# Patient Record
Sex: MALE | Race: BLACK OR AFRICAN AMERICAN | NOT HISPANIC OR LATINO | Employment: OTHER | URBAN - METROPOLITAN AREA
[De-identification: names, ages, dates, MRNs, and addresses within clinical notes are randomized per-mention and may not be internally consistent; named-entity substitution may affect disease eponyms.]

---

## 2021-03-05 ENCOUNTER — OFFICE VISIT (OUTPATIENT)
Dept: UROLOGY | Facility: CLINIC | Age: 66
End: 2021-03-05
Payer: MEDICARE

## 2021-03-05 ENCOUNTER — LAB VISIT (OUTPATIENT)
Dept: LAB | Facility: HOSPITAL | Age: 66
End: 2021-03-05
Payer: MEDICARE

## 2021-03-05 VITALS
HEIGHT: 67 IN | WEIGHT: 185.44 LBS | DIASTOLIC BLOOD PRESSURE: 86 MMHG | HEART RATE: 82 BPM | SYSTOLIC BLOOD PRESSURE: 138 MMHG | BODY MASS INDEX: 29.1 KG/M2

## 2021-03-05 DIAGNOSIS — N52.01 ERECTILE DYSFUNCTION DUE TO ARTERIAL INSUFFICIENCY: ICD-10-CM

## 2021-03-05 DIAGNOSIS — N13.8 BPH WITH URINARY OBSTRUCTION: ICD-10-CM

## 2021-03-05 DIAGNOSIS — R35.1 NOCTURIA: ICD-10-CM

## 2021-03-05 DIAGNOSIS — R35.0 URINARY FREQUENCY: Primary | ICD-10-CM

## 2021-03-05 DIAGNOSIS — N40.1 BPH WITH URINARY OBSTRUCTION: ICD-10-CM

## 2021-03-05 DIAGNOSIS — R35.0 URINARY FREQUENCY: ICD-10-CM

## 2021-03-05 LAB
BILIRUB SERPL-MCNC: NORMAL MG/DL
BLOOD URINE, POC: NORMAL
CLARITY, POC UA: CLEAR
COLOR, POC UA: NORMAL
COMPLEXED PSA SERPL-MCNC: 0.29 NG/ML (ref 0–4)
CREAT SERPL-MCNC: 1 MG/DL (ref 0.5–1.4)
EST. GFR  (AFRICAN AMERICAN): >60 ML/MIN/1.73 M^2
EST. GFR  (NON AFRICAN AMERICAN): >60 ML/MIN/1.73 M^2
GLUCOSE UR QL STRIP: NORMAL
KETONES UR QL STRIP: NORMAL
LEUKOCYTE ESTERASE URINE, POC: NORMAL
NITRITE, POC UA: NORMAL
PH, POC UA: 5
POC RESIDUAL URINE VOLUME: 42 ML (ref 0–100)
PROTEIN, POC: NORMAL
SPECIFIC GRAVITY, POC UA: 1.02
TESTOST SERPL-MCNC: 476 NG/DL (ref 304–1227)
UROBILINOGEN, POC UA: NORMAL

## 2021-03-05 PROCEDURE — 51798 POCT BLADDER SCAN: ICD-10-PCS | Mod: S$GLB,,, | Performed by: NURSE PRACTITIONER

## 2021-03-05 PROCEDURE — 36415 COLL VENOUS BLD VENIPUNCTURE: CPT | Performed by: NURSE PRACTITIONER

## 2021-03-05 PROCEDURE — 1126F PR PAIN SEVERITY QUANTIFIED, NO PAIN PRESENT: ICD-10-PCS | Mod: S$GLB,,, | Performed by: NURSE PRACTITIONER

## 2021-03-05 PROCEDURE — 99204 OFFICE O/P NEW MOD 45 MIN: CPT | Mod: 25,S$GLB,, | Performed by: NURSE PRACTITIONER

## 2021-03-05 PROCEDURE — 99999 PR PBB SHADOW E&M-NEW PATIENT-LVL III: CPT | Mod: PBBFAC,,, | Performed by: NURSE PRACTITIONER

## 2021-03-05 PROCEDURE — 99999 PR PBB SHADOW E&M-NEW PATIENT-LVL III: ICD-10-PCS | Mod: PBBFAC,,, | Performed by: NURSE PRACTITIONER

## 2021-03-05 PROCEDURE — 84403 ASSAY OF TOTAL TESTOSTERONE: CPT | Performed by: NURSE PRACTITIONER

## 2021-03-05 PROCEDURE — 1126F AMNT PAIN NOTED NONE PRSNT: CPT | Mod: S$GLB,,, | Performed by: NURSE PRACTITIONER

## 2021-03-05 PROCEDURE — 1101F PR PT FALLS ASSESS DOC 0-1 FALLS W/OUT INJ PAST YR: ICD-10-PCS | Mod: CPTII,S$GLB,, | Performed by: NURSE PRACTITIONER

## 2021-03-05 PROCEDURE — 99204 PR OFFICE/OUTPT VISIT, NEW, LEVL IV, 45-59 MIN: ICD-10-PCS | Mod: 25,S$GLB,, | Performed by: NURSE PRACTITIONER

## 2021-03-05 PROCEDURE — 51798 US URINE CAPACITY MEASURE: CPT | Mod: S$GLB,,, | Performed by: NURSE PRACTITIONER

## 2021-03-05 PROCEDURE — 81002 URINALYSIS NONAUTO W/O SCOPE: CPT | Mod: S$GLB,,, | Performed by: NURSE PRACTITIONER

## 2021-03-05 PROCEDURE — 3008F PR BODY MASS INDEX (BMI) DOCUMENTED: ICD-10-PCS | Mod: CPTII,S$GLB,, | Performed by: NURSE PRACTITIONER

## 2021-03-05 PROCEDURE — 1101F PT FALLS ASSESS-DOCD LE1/YR: CPT | Mod: CPTII,S$GLB,, | Performed by: NURSE PRACTITIONER

## 2021-03-05 PROCEDURE — 3288F FALL RISK ASSESSMENT DOCD: CPT | Mod: CPTII,S$GLB,, | Performed by: NURSE PRACTITIONER

## 2021-03-05 PROCEDURE — 3008F BODY MASS INDEX DOCD: CPT | Mod: CPTII,S$GLB,, | Performed by: NURSE PRACTITIONER

## 2021-03-05 PROCEDURE — 81002 POCT URINE DIPSTICK WITHOUT MICROSCOPE: ICD-10-PCS | Mod: S$GLB,,, | Performed by: NURSE PRACTITIONER

## 2021-03-05 PROCEDURE — 84153 ASSAY OF PSA TOTAL: CPT | Performed by: NURSE PRACTITIONER

## 2021-03-05 PROCEDURE — 82565 ASSAY OF CREATININE: CPT | Performed by: NURSE PRACTITIONER

## 2021-03-05 PROCEDURE — 3288F PR FALLS RISK ASSESSMENT DOCUMENTED: ICD-10-PCS | Mod: CPTII,S$GLB,, | Performed by: NURSE PRACTITIONER

## 2021-03-05 RX ORDER — TAMSULOSIN HYDROCHLORIDE 0.4 MG/1
0.4 CAPSULE ORAL NIGHTLY
Qty: 90 CAPSULE | Refills: 3 | Status: SHIPPED | OUTPATIENT
Start: 2021-03-05 | End: 2022-01-26

## 2021-03-05 RX ORDER — SILDENAFIL 100 MG/1
100 TABLET, FILM COATED ORAL DAILY PRN
Qty: 10 TABLET | Refills: 12 | Status: SHIPPED | OUTPATIENT
Start: 2021-03-05 | End: 2022-03-05

## 2022-10-24 ENCOUNTER — TELEPHONE (OUTPATIENT)
Dept: NEUROLOGY | Facility: CLINIC | Age: 67
End: 2022-10-24
Payer: MEDICARE

## 2022-10-24 DIAGNOSIS — R20.0 NUMBNESS OF ARM: Primary | ICD-10-CM

## 2022-10-24 NOTE — TELEPHONE ENCOUNTER
Spoke to patient's wife Nivia and scheduled the EMG Procedure that was ordered.  She asked that it please be scheduled for a morning appointment, preferably 9am. The appointment is scheduled for Monday December 19th at 9 am. The address on file was confirmed with Ms. Gonzáles and an appointment letter is being placed in the mail.   No

## 2022-10-24 NOTE — TELEPHONE ENCOUNTER
----- Message from Marcia Rodriguez MA sent at 10/20/2022  9:01 AM CDT -----  Good morning,    We received an order from  for this patient to have an EMG. The referring diagnosis is R20.0. I have scanned the order into . Please contact the patient to schedule.    Thank you   [facility]   Ext [phone]

## 2022-12-19 ENCOUNTER — PROCEDURE VISIT (OUTPATIENT)
Dept: NEUROLOGY | Facility: CLINIC | Age: 67
End: 2022-12-19
Payer: MEDICARE

## 2022-12-19 DIAGNOSIS — R20.0 NUMBNESS OF ARM: ICD-10-CM

## 2022-12-19 PROCEDURE — 95886 MUSC TEST DONE W/N TEST COMP: CPT | Mod: S$GLB,,, | Performed by: PSYCHIATRY & NEUROLOGY

## 2022-12-19 PROCEDURE — 95913 NRV CNDJ TEST 13/> STUDIES: CPT | Mod: S$GLB,,, | Performed by: PSYCHIATRY & NEUROLOGY

## 2022-12-19 PROCEDURE — 95886 PR EMG COMPLETE, W/ NERVE CONDUCTION STUDIES, 5+ MUSCLES: ICD-10-PCS | Mod: S$GLB,,, | Performed by: PSYCHIATRY & NEUROLOGY

## 2022-12-19 PROCEDURE — 95913 PR NERVE CONDUCTION STUDY; 13 OR MORE STUDIES: ICD-10-PCS | Mod: S$GLB,,, | Performed by: PSYCHIATRY & NEUROLOGY

## 2024-11-01 ENCOUNTER — OFFICE VISIT (OUTPATIENT)
Dept: UROLOGY | Facility: CLINIC | Age: 69
End: 2024-11-01
Payer: MEDICARE

## 2024-11-01 ENCOUNTER — LAB VISIT (OUTPATIENT)
Dept: LAB | Facility: HOSPITAL | Age: 69
End: 2024-11-01
Attending: STUDENT IN AN ORGANIZED HEALTH CARE EDUCATION/TRAINING PROGRAM
Payer: MEDICARE

## 2024-11-01 VITALS
DIASTOLIC BLOOD PRESSURE: 71 MMHG | SYSTOLIC BLOOD PRESSURE: 127 MMHG | HEIGHT: 67 IN | BODY MASS INDEX: 25.47 KG/M2 | HEART RATE: 56 BPM | WEIGHT: 162.25 LBS

## 2024-11-01 DIAGNOSIS — C61 PROSTATE CANCER: ICD-10-CM

## 2024-11-01 DIAGNOSIS — C61 PROSTATE CANCER: Primary | ICD-10-CM

## 2024-11-01 PROCEDURE — 36415 COLL VENOUS BLD VENIPUNCTURE: CPT | Performed by: STUDENT IN AN ORGANIZED HEALTH CARE EDUCATION/TRAINING PROGRAM

## 2024-11-01 PROCEDURE — 84153 ASSAY OF PSA TOTAL: CPT | Performed by: STUDENT IN AN ORGANIZED HEALTH CARE EDUCATION/TRAINING PROGRAM

## 2024-11-01 PROCEDURE — 99999 PR PBB SHADOW E&M-EST. PATIENT-LVL III: CPT | Mod: PBBFAC,,, | Performed by: STUDENT IN AN ORGANIZED HEALTH CARE EDUCATION/TRAINING PROGRAM

## 2024-11-04 LAB — PSA SERPL-MCNC: 10.2 NG/ML

## 2024-11-08 ENCOUNTER — PATIENT MESSAGE (OUTPATIENT)
Dept: UROLOGY | Facility: CLINIC | Age: 69
End: 2024-11-08
Payer: MEDICARE

## 2024-11-14 DIAGNOSIS — C61 PROSTATE CANCER: Primary | ICD-10-CM

## 2024-11-18 ENCOUNTER — TELEPHONE (OUTPATIENT)
Dept: HEMATOLOGY/ONCOLOGY | Facility: CLINIC | Age: 69
End: 2024-11-18
Payer: MEDICARE

## 2024-11-18 NOTE — NURSING
Nurse navigator spoke with daughter to coordinate med/onc appt with Dr. Maravilla on 12/11/24.  Daughter states understanding and all questions answered.  Contact info given for any future questions.

## 2024-11-19 ENCOUNTER — PATIENT MESSAGE (OUTPATIENT)
Dept: UROLOGY | Facility: CLINIC | Age: 69
End: 2024-11-19
Payer: MEDICARE

## 2024-12-11 ENCOUNTER — HOSPITAL ENCOUNTER (OUTPATIENT)
Dept: RADIOLOGY | Facility: HOSPITAL | Age: 69
Discharge: HOME | End: 2024-12-11
Attending: STUDENT IN AN ORGANIZED HEALTH CARE EDUCATION/TRAINING PROGRAM
Payer: MEDICARE

## 2024-12-11 ENCOUNTER — OFFICE VISIT (OUTPATIENT)
Dept: HEMATOLOGY/ONCOLOGY | Facility: CLINIC | Age: 69
End: 2024-12-11
Payer: MEDICARE

## 2024-12-11 VITALS
OXYGEN SATURATION: 95 % | WEIGHT: 173.06 LBS | TEMPERATURE: 98 F | DIASTOLIC BLOOD PRESSURE: 68 MMHG | SYSTOLIC BLOOD PRESSURE: 136 MMHG | HEART RATE: 63 BPM | BODY MASS INDEX: 27.11 KG/M2

## 2024-12-11 DIAGNOSIS — D75.89 MACROCYTOSIS: ICD-10-CM

## 2024-12-11 DIAGNOSIS — D63.0 ANEMIA IN NEOPLASTIC DISEASE: ICD-10-CM

## 2024-12-11 DIAGNOSIS — C61 PROSTATE CANCER: ICD-10-CM

## 2024-12-11 DIAGNOSIS — Z79.818 ANDROGEN DEPRIVATION THERAPY: ICD-10-CM

## 2024-12-11 DIAGNOSIS — Z79.899 LONG TERM CURRENT USE OF THERAPEUTIC DRUG: ICD-10-CM

## 2024-12-11 DIAGNOSIS — C61 PROSTATE CANCER: Primary | ICD-10-CM

## 2024-12-11 PROCEDURE — A9585 GADOBUTROL INJECTION: HCPCS | Performed by: STUDENT IN AN ORGANIZED HEALTH CARE EDUCATION/TRAINING PROGRAM

## 2024-12-11 PROCEDURE — 72197 MRI PELVIS W/O & W/DYE: CPT | Mod: TC

## 2024-12-11 PROCEDURE — 99999 PR PBB SHADOW E&M-EST. PATIENT-LVL V: CPT | Mod: PBBFAC,,, | Performed by: HOSPITALIST

## 2024-12-11 PROCEDURE — 99205 OFFICE O/P NEW HI 60 MIN: CPT | Mod: S$GLB,,, | Performed by: HOSPITALIST

## 2024-12-11 PROCEDURE — 3075F SYST BP GE 130 - 139MM HG: CPT | Mod: CPTII,S$GLB,, | Performed by: HOSPITALIST

## 2024-12-11 PROCEDURE — 1159F MED LIST DOCD IN RCRD: CPT | Mod: CPTII,S$GLB,, | Performed by: HOSPITALIST

## 2024-12-11 PROCEDURE — 1126F AMNT PAIN NOTED NONE PRSNT: CPT | Mod: CPTII,S$GLB,, | Performed by: HOSPITALIST

## 2024-12-11 PROCEDURE — 72197 MRI PELVIS W/O & W/DYE: CPT | Mod: 26,,, | Performed by: RADIOLOGY

## 2024-12-11 PROCEDURE — 1101F PT FALLS ASSESS-DOCD LE1/YR: CPT | Mod: CPTII,S$GLB,, | Performed by: HOSPITALIST

## 2024-12-11 PROCEDURE — 3008F BODY MASS INDEX DOCD: CPT | Mod: CPTII,S$GLB,, | Performed by: HOSPITALIST

## 2024-12-11 PROCEDURE — 3288F FALL RISK ASSESSMENT DOCD: CPT | Mod: CPTII,S$GLB,, | Performed by: HOSPITALIST

## 2024-12-11 PROCEDURE — 25500020 PHARM REV CODE 255: Performed by: STUDENT IN AN ORGANIZED HEALTH CARE EDUCATION/TRAINING PROGRAM

## 2024-12-11 PROCEDURE — 3078F DIAST BP <80 MM HG: CPT | Mod: CPTII,S$GLB,, | Performed by: HOSPITALIST

## 2024-12-11 RX ORDER — VIT C/E/ZN/COPPR/LUTEIN/ZEAXAN 250MG-90MG
1000 CAPSULE ORAL DAILY
Qty: 30 CAPSULE | Refills: 11 | Status: SHIPPED | OUTPATIENT
Start: 2024-12-11

## 2024-12-11 RX ORDER — BICALUTAMIDE 50 MG/1
50 TABLET, FILM COATED ORAL DAILY
Qty: 30 TABLET | Refills: 0 | Status: SHIPPED | OUTPATIENT
Start: 2024-12-11 | End: 2025-12-11

## 2024-12-11 RX ORDER — GADOBUTROL 604.72 MG/ML
8 INJECTION INTRAVENOUS
Status: COMPLETED | OUTPATIENT
Start: 2024-12-11 | End: 2024-12-11

## 2024-12-11 RX ADMIN — GADOBUTROL 8 ML: 604.72 INJECTION INTRAVENOUS at 07:12

## 2024-12-11 NOTE — PATIENT INSTRUCTIONS
We reviewed your recent PSA levels following radiation treatment for prostate cancer in 2012. Quickly rising PSA is consistent with recurrent prostate cancer. MRI from today is pending and will schedule PSMA PET CT ASAP to determine the extent to which the cancer has returned.    Regardless, we will need to start hormonal therapy for prostate cancer. RX for bicalutamide called into your local pharmacy. Can start taking right after PSMA PET CT scan.Will also plan to start androgen deprivation therapy with Lupron shots in 2-3 weeks.    We discussed typical side effects of hormone therapy including fatigue, weight gain, loss libido, forgetfulness, hot flashes, and muscle loss. Other longer term risks can include increased risk of cardiovascular disease and osteoporosis.    - We will schedule you an appointment with our genetics counseling clinic to discuss screening for the presence of a high risk cancer gene     - Please start taking vitamin D 1000 IU daily; I called a prescription into your local pharmacy  - Please get 4 servings of calcium daily  - We will schedule a bone mineral density test    Plan to follow up with first Lupron shot and to review PSMA PET CT in ~3 weeks.    If cancer has spread, will need to discuss adding additional hormone pills.    If the cancer has not spread, will look into referring you to radiation oncology.    Good information on prostate cancer can be found at [email or web address]

## 2024-12-11 NOTE — PROGRESS NOTES
[facility]: New patient visit  Best Contact Phone Number(s): [phone] (home)       Cancer/Stage/TNM:    Cancer Staging   No matching staging information was found for the patient.        Reason for visit:  Elevated PSA with history of prostate cancer    Molecular:  Germline Testing: N/A  Somatic Testing: N/A    Treatment History:   N/A     HPI:   Mode Gabriel is a 69 y.o. male with metachronous recurrence of prostate cancer. Initially diagnosed 2012 at [facility], underwent short course course ADT with definitive EBRT. Ultimately lost to follow up. Represented to PCP 10/2024 with pSA elevated to 5.98. More recently 10.2 on 11/1/24. He presents to medical oncology clinic for initial evaluation.    No difficulty urinating. No hesitancy. Flow is normal. Does have some moderate urgency and frequency but no incontinence. Nocturia about 3x per night.  Not on any BPH medication. Energy levels are good. Lives with his wife; accompanied by daughter Rae. Ambulates independently indefinitely. Rides his bike frequently as well. No pain. Appetite is OK. No N/V. No CP, SOB or cough. No abdominal pain. Reports and about 10 pound weight  loss over the last year, but weight is back up. No night sweats or fevers or chills.     Denies signficant other medical issues. No MI or stroke. No known DM2. No COPD or asthma. Does not take any medication    Lab Results   Component Value Date    PSADIAG 0.29 03/05/2021         History has been obtained by chart review and discussion with the patient.     Oncology History   Prostate cancer   2012 Initial Diagnosis    2012: Initial diagnosis and treatment at [facility]. Reportedly Elvis 7. Received hormone therapy and short course ADT.      11/2019 Tumor Markers    11/12/2019 -- PSA 0.3  12/24/2020 -- PSA 0.3  3/5/2021 -- PSA 0.29     10/2024 Tumor Markers    10/07/2024: PSA 5.98  11/01/2024: PSA 10.2           Past Medical History:   Diagnosis Date    Prostate cancer           Past Surgical History:   Procedure Laterality Date    BACK SURGERY N/A 2000    [facility]         Review of patient's allergies indicates:  No Known Allergies      Current Outpatient Medications   Medication Sig Dispense Refill    bicalutamide (CASODEX) 50 MG Tab Take 1 tablet (50 mg total) by mouth once daily. 30 tablet 0    cholecalciferol, vitamin D3, (VITAMIN D3) 25 mcg (1,000 unit) capsule Take 1 capsule (1,000 Units total) by mouth once daily. 30 capsule 11    sildenafiL (VIAGRA) 100 MG tablet Take 1 tablet (100 mg total) by mouth daily as needed for Erectile Dysfunction. 10 tablet 12    tamsulosin (FLOMAX) 0.4 mg Cap TAKE 1 CAPSULE(0.4 MG) BY MOUTH EVERY EVENING 90 capsule 0     No current facility-administered medications for this visit.        Objective:      Physical Exam:   /68 (BP Location: Left arm, Patient Position: Sitting)   Pulse 63   Temp 98 °F (36.7 °C) (Temporal)   Wt 78.5 kg (173 lb 1 oz)   SpO2 95%   BMI 27.11 kg/m²       ECOG Performance status: (0) Fully active, able to carry on all predisease performance without restriction     Physical Exam  Constitutional:       General: He is not in acute distress.     Appearance: Normal appearance.   HENT:      Head: Normocephalic.   Eyes:      General: No scleral icterus.     Extraocular Movements: Extraocular movements intact.      Conjunctiva/sclera: Conjunctivae normal.   Cardiovascular:      Rate and Rhythm: Normal rate.   Pulmonary:      Effort: Pulmonary effort is normal. No respiratory distress.   Abdominal:      General: There is no distension.      Palpations: Abdomen is soft.   Skin:     General: Skin is warm and dry.   Neurological:      Mental Status: He is alert and oriented to person, place, and time.      Motor: No weakness.   Psychiatric:         Mood and Affect: Mood normal.         Behavior: Behavior normal.         Thought Content: Thought content normal.          Recent Labs:   Lab Results   Component Value Date    CREATININE  "1.0 03/05/2021          Lab Results   Component Value Date    PSADIAG 0.29 03/05/2021        Cardiovascular Screening:  Primary care physician: Evens Martins MD      The ASCVD Risk score (Mariposa DK, et al., 2019) failed to calculate for the following reasons:    Cannot find a previous HDL lab    Cannot find a previous total cholesterol lab    EKG: No results found for this or any previous visit.    Body mass index is 27.11 kg/m².    No results found for: "LABA1C", "CHOL", "LDLCALC", "HDL", "TRIG", "HGBA1C"       Bone Health    No results found for: "[id number]", "SYGQRHKM82RP"     No results found for this or any previous visit.         PSMA PET IMAGING+     No results found for this or any previous visit.       I have personally reviewed the above imaging.     Path:   Reviewed pathology as documented above.      Diagnoses:     1. Prostate cancer    2. Anemia in neoplastic disease    3. Androgen deprivation therapy    4. Long term current use of therapeutic drug    5. Macrocytosis          Assessment and Plan:     1. Prostate cancer  Overview:  Metachronous recurrence of prostate cancer. Initially diagnosed 2012 at [facility], underwent short course course ADT with definitive EBRT. Ultimately lost to follow up. Represented to PCP 10/2024 with pSA elevated to 5.98. More recently 10.2 on 11/1/24.    Assessment & Plan:  At minimum he has high-risk BCR. PSMA PET is pending to determine if he has gross radiologic disease or metastatic disease. Will require hormonal therapy of some variety. Will expedite PSMA PET CT and plan to start bicalutamide after imaging. Plan for first Lupron in 2-3 weeks. If metastatic disease would intensify treatment; if localized BCR then would plan finite ADT with referral to rad onc.  - Expedite PSMA PET CT  - FU MRI read  - Obtain [facility] Records  - Start bicalutamide 50mg po daily x30 days after PSMA PET CT  - Genetics referral  - DEXA and Vitmian D  - FU in 3 weeks for first Lupron " shot and to review PSMA PET CT    Orders:  -     Ambulatory referral/consult to Hematology / Oncology  -     NM PET CT GA68 PSMA, midthigh to vertex; Future; Expected date: 12/11/2024  -     CBC Oncology; Standing  -     Comprehensive Metabolic Panel; Standing  -     Prostate Specific Antigen, Diagnostic; Standing  -     Testosterone; Standing  -     bicalutamide (CASODEX) 50 MG Tab; Take 1 tablet (50 mg total) by mouth once daily.  Dispense: 30 tablet; Refill: 0  -     Ambulatory referral/consult to Genetics; Future; Expected date: 12/18/2024    2. Anemia in neoplastic disease  -     Ferritin; Future; Expected date: 12/11/2024  -     Iron and TIBC; Future; Expected date: 12/11/2024  -     VITAMIN B12; Future; Expected date: 12/11/2024    3. Androgen deprivation therapy  -     cholecalciferol, vitamin D3, (VITAMIN D3) 25 mcg (1,000 unit) capsule; Take 1 capsule (1,000 Units total) by mouth once daily.  Dispense: 30 capsule; Refill: 11  -     DXA Bone Density Axial Skeleton 1 or more sites; Future; Expected date: 12/11/2024  -     Vitamin D; Future; Expected date: 12/11/2024    4. Long term current use of therapeutic drug  -     cholecalciferol, vitamin D3, (VITAMIN D3) 25 mcg (1,000 unit) capsule; Take 1 capsule (1,000 Units total) by mouth once daily.  Dispense: 30 capsule; Refill: 11  -     DXA Bone Density Axial Skeleton 1 or more sites; Future; Expected date: 12/11/2024  -     Vitamin D; Future; Expected date: 12/11/2024  -     VITAMIN B12; Future; Expected date: 12/11/2024    5. Macrocytosis          Follow up:   Route Chart for Scheduling    Med Onc Chart Routing      Follow up with physician 3 weeks.   Follow up with MICHAEL    Infusion scheduling note    Injection scheduling note Lupron 3 weeks   Labs CBC, CMP and PSA   Scheduling:  Preferred lab:  Lab interval:     Imaging DXA scan      Pharmacy appointment    Other referrals refer to Oncology Primary Care - Wants to establish primary care at [facility]                     Supportive Plan Information  OP PROSTATE LEUPROLIDE Q3MO Eliseo Maravilla MD   Associated Diagnosis: Prostate cancer   noted on 12/11/2024   Line of treatment: First Line   Treatment goal: Palliative     Upcoming Treatment Dates - OP PROSTATE LEUPROLIDE Q3MO    1/2/2025       Chemotherapy       leuprolide (LUPRON) injection 22.5 mg  3/27/2025       Chemotherapy       leuprolide (LUPRON) injection 22.5 mg  6/19/2025       Chemotherapy       leuprolide (LUPRON) injection 22.5 mg  9/11/2025       Chemotherapy       leuprolide (LUPRON) injection 22.5 mg         The above information has been reviewed with the patient and all questions have been answered to their apparent satisfaction.  They understand that they can call the clinic with any questions.    Eliseo Maravilla MD MPH  Staff Physician     [facility] [address] [address]  Email: [email or web address]  Phone: o) [phone] (c) [phone]

## 2024-12-11 NOTE — ASSESSMENT & PLAN NOTE
At minimum he has high-risk BCR. PSMA PET is pending to determine if he has gross radiologic disease or metastatic disease. Will require hormonal therapy of some variety. Will expedite PSMA PET CT and plan to start bicalutamide after imaging. Plan for first Lupron in 2-3 weeks. If metastatic disease would intensify treatment; if localized BCR then would plan finite ADT with referral to rad onc.  - Expedite PSMA PET CT  - FU MRI read  - Obtain [facility] Records  - Start bicalutamide 50mg po daily x30 days after PSMA PET CT  - Genetics referral  - DEXA and Vitmian D  - FU in 3 weeks for first Lupron shot and to review PSMA PET CT

## 2024-12-12 ENCOUNTER — TELEPHONE (OUTPATIENT)
Dept: HEMATOLOGY/ONCOLOGY | Facility: CLINIC | Age: 69
End: 2024-12-12
Payer: MEDICARE

## 2024-12-19 ENCOUNTER — OFFICE VISIT (OUTPATIENT)
Dept: HEMATOLOGY/ONCOLOGY | Facility: CLINIC | Age: 69
End: 2024-12-19
Payer: MEDICARE

## 2024-12-19 ENCOUNTER — PATIENT MESSAGE (OUTPATIENT)
Dept: HEMATOLOGY/ONCOLOGY | Facility: CLINIC | Age: 69
End: 2024-12-19

## 2024-12-19 ENCOUNTER — TELEPHONE (OUTPATIENT)
Dept: HEMATOLOGY/ONCOLOGY | Facility: CLINIC | Age: 69
End: 2024-12-19

## 2024-12-19 DIAGNOSIS — C61 PROSTATE CANCER: ICD-10-CM

## 2024-12-19 DIAGNOSIS — Z71.83 ENCOUNTER FOR NONPROCREATIVE GENETIC COUNSELING: Primary | ICD-10-CM

## 2024-12-19 DIAGNOSIS — Z80.42 FAMILY HISTORY OF PROSTATE CANCER: ICD-10-CM

## 2024-12-19 DIAGNOSIS — C61 PROSTATE CANCER: Primary | ICD-10-CM

## 2024-12-19 NOTE — PROGRESS NOTES
"Cancer Genetics  Department of Hematology and Oncology  [facility]    Date of Service:  24  Visit Provider:  Preston Woods DNP  Collaborating Physician:  Shirley Guy MD    Patient ID  Name: Mode Gabriel    : 1955    MRN: [id number]      Referring Provider  Eliseo Maravilla MD  [address] [place] [address]    Televisit Information  The patient location is:  [place], [place].    The chief complaint leading to consultation is:  As below.    Visit type:  audiovisual followed by audio-only.  The reason for the audio-only service rather than synchronous-audio-and-video virtual visit was related to technical difficulties or patient preference/necessity.    Face-to-face time with patient:  Approximately 1 minutes.  Audio-only time with patient:  Approximately 26 minutes.  Total time:  Approximately 36 minutes.  This includes face to face time and non-face to face time preparing to see the patient (eg, review of tests), obtaining and/or reviewing separately obtained history, documenting clinical information in the electronic or other health record, independently interpreting results and communicating results to the patient/family/caregiver, or care coordinator.     SUBJECTIVE      Chief Complaint: Genetic Evaluation    History of Present Illness (HPI):  Mode Gabriel ("Mdoe"), 69 y.o., assigned male sex at birth, is established with the [facility] but new to me.  He was referred by Dr. Eliseo Maravilla for cancer-genetic risk assessment given his diagnosis of prostate cancer.    Focused Medical History  Genetic testing:  No  Cancer:  Prostate cancer, dx.age 56; underwent short-course ADT with definitive EBRT; re-presented 10/2024 with elevated PSA; PET-CT scheduled for 2024  Colon polyp:  "I think I did" have colon polyps ("small") - Undergoes colonoscopies at [facility], " "most recently last year(?)  Other tumor or pertinent mass/lesion:  No  Pancreatitis:  No  Blood disorder:  No    Focused Social History  Former smoker    ONCOLOGY PEDIGREE  Ancestry:  [employer]:  No  Consanguinity:  No  Hereditary cancer genetic testing in blood relatives:  No  Other than noted, no known family history of cancer.  Other than noted, no known family history of colon polyps.    ** If this pedigree appears small/illegible on your screen, expand this note window horizontally. **      Review of Systems  See HPI.       OBJECTIVE     Patient Active Problem List    Diagnosis Date Noted    Prostate cancer 12/11/2024     Past Medical History:   Diagnosis Date    Prostate cancer      Physical Exam  Significantly limited secondary to the inherent nature of a virtual visit.  Very pleasant patient.  Accompanied by his daughter, Rae, who is also very pleasant.  Constitutional      Appearance:  Appears well developed and well nourished. No distress.   Neurological     Mental Status:  Alert and oriented.  Psychiatric         Mood and Affect:  Normal.     Thought Content:  Normal.     Speech:  Normal.     Behavior:  Normal.     Judgment:  Normal.    ASSESSMENT / PLAN      Mode Gabriel ("Mode"), 69 y.o., assigned male sex at birth, is established with the [facility] but new to me.  He was referred by Dr. Eliseo Maravilla for cancer-genetic risk assessment given his diagnosis of prostate cancer.      From a clinical standpoint, regarding hereditary cancer susceptibility gene testing:  Mode meets current National Comprehensive Cancer Network (NCCN) criteria for such genetic testing based on his personal/family history of prostate cancer.  I do clinically recommend such testing for him.    Cancer Genetics  Germline cancer-genetic testing is the testing of genes associated with cancer, known as cancer susceptibility genes.  Just as these genes are inherited from the " parents--one copy of each gene from each parent--mutations in these genes can be inherited, as well.  A mutation in a cancer susceptibility gene adversely affects the gene's ability to prevent cancer; therefore, carriers of cancer susceptibility gene mutations may be at increased risk for certain cancers.     Causes of Cancer  Only approximately 5%-10% of cancers are caused by an inherited cancer susceptibility gene mutation; rather, the majority of cancers are sporadic.  Causes of sporadic cancer may include environmental risk factors, lifestyle risk factors, and non-modifiable risk factors.  Family members often share not only genetics but also other risk factors, including environmental and lifestyle risk factors, so cancers can be familial.     Potential Results of Genetic Testing, and Their Implications  Potential results of genetic testing include positive, negative, and variant of unknown significance (VUS).    Positive:  A positive result indicates the presence of at least one clinically significant gene mutation, and the individual's associated cancer risks vary depending upon the cancer susceptibility gene(s) in which there is/are a mutation(s).  With a positive result, depending upon the specific result and the individual's clinical history, modified risk management may be recommended, including measures for risk reduction and/or surveillance; however, there are not always effective strategies for risk management.  There may be reproductive implications of a positive genetic test result, and there may be cancer-treatment implications of a positive genetic test result.  Negative:  A negative result indicates that no clinically significant mutations were identified in the gene(s) tested.  A negative result does not indicate that that individual cannot develop cancer, and, in fact, that individual may even be at increased risk for cancer based on shared risk factors with affected relatives.  The ability to  interpret the meaning of a negative genetic testing result is limited in an individual unaffected with cancer whose affected relatives have not first undergone such testing.  The most informative family member to undergo testing for hereditary cancer syndromes first are those who have personally had cancer.    VUS:  A VUS is a genetic change for which there is not presently enough data for the laboratory to make a determination as to whether the genetic variant is clinically significant.  VUSs are not typically acted upon clinically.       Genetic Mutation Inheritance  When an individual tests positive for a gene mutation, their first-degree relatives each have a 50% chance of carrying the same mutation, and other, more distant blood relatives may also be at risk of carrying the same mutation.      Genetic Discrimination  Genetic discrimination occurs when an individual is discriminated against on the basis of their genetic information.  The Genetic Information Nondiscrimination Act of 2008 (SUZY) is [place] federal legislation that provides some protections against the use of an individual's genetic information by their health insurer and by their employer.  Title I of SUZY prohibits most health insurers from utilizing an individual's genetic information to make decisions regarding insurance eligibility or premium charges; this protection does not apply to health insurance obtained through a job with the [employer], and it may not apply to health insurance obtained through the Federal Employees Health Benefits Plan.  Title II of SUZY prohibits covered entities, in many cases, from requesting or requiring the genetic information of employees and applicants and from using said information to make employment decisions; this protection does not apply to employers with fewer than 15 employees or to the .  SUZY does not protect individuals from genetic discrimination by any other type of policy or entity, including  but not limited to life insurance, disability insurance, long-term care insurance,  benefits, and [facility] benefits.    Genetic Testing Logistics  An outside laboratory would perform the testing after a blood sample is collected at [facility].  The test is expected to take approximately three weeks to result.  The patient may incur out-of-pocket costs related to genetic testing.  Post-test genetic counseling can be conducted once the genetic testing results are available.     Plan  Offered Mode options of proceeding with hereditary cancer susceptibility gene testing at this time versus delaying/declining such.  Mode desires to proceed with such testing at this time, depending on cost.  Provided germline cancer genetic test options of focused panel versus broad panel, and Mode opts for the latter.  Reached out to [facility] to determine patient OOP cost (with no PHI divulged); awaiting response, and then plan to follow up with Mode via [facility] portal.               ICD-10-CM ICD-9-CM   1. Encounter for nonprocreative genetic counseling  Z71.83 V26.33   2. Prostate cancer  C61 185   3. Family history of prostate cancer  Z80.42 V16.42     1. Encounter for nonprocreative genetic counseling    2. Prostate cancer  - Ambulatory referral/consult to Genetics    3. Family history of prostate cancer           Information to Follow Your Cancer Genetics Visit    Mode:  Below is some information to follow your cancer genetics visit.  Please read this information and let me know if you have any questions or concerns.    Health Maintenance / Cancer Risks and Risk Management    Only a small percentage (approximately 5%-10%) of cancers are hereditary, meaning caused by an inherited gene mutation; rather, most cancers are sporadic.  Environmental factors, lifestyle factors, and even factors beyond our control play a significant role in the development of many cancers.  As such, an individual can  "develop cancer even if they don't have an identifiable hereditary predisposition.  Furthermore, even with negative genetic testing, an individual can still be at increased risk for certain cancers, as they may independently have risk factors for those cancers and/or may share risk factors with their family members affected by cancer.  For these reasons, it is strongly recommended that you ensure that your healthcare providers are aware of and up-to-date on your personal and family history so that your medical management, including cancer screenings, can be based in part off of this information.      The following cancer screenings are currently recommended for you (please note that these recommendations can change based on updates to clinical guidelines and/or with changes to your medical or family history and are therefore only considered accurate as of the date on which this document was composed; additional and/or alternative screenings may be recommended by your healthcare providers, and recommendations from your healthcare providers directly managing these risks supersede the below recommendations):     The terms "female" and "male" below refer to assigned sex at birth.    Cancer in general:    Attend an annual visit with a primary care provider (PCP) for history review, physical exam, and age-appropriate testing.    Gynecologic cancers (females only):  Attend an annual visit with your gynecology provider, which may include pelvic exam and/or Pap smear/HPV testing.    Breast cancer (females only):  If you have never had breast cancer, undergo ongoing breast cancer risk assessment and breast cancer risk-reduction counseling with your gynecology provider (or you can reach out to me for such) or, if you have one, your breast specialist.   Whether you have breasts or have undergone mastectomies, practice ongoing breast awareness, meaning you are familiar with your breasts, perform breast self-exams at least " monthly, and promptly report changes/concerns to your gynecology provider or breast specialist/oncologist.  Undergo clinical breast exam by a healthcare provider every 12 months or, if your gynecology provider or breast specialist/oncologist recommends, more often.  If you have one or both of your breasts (in other words, you haven't undergone a double mastectomy), undergo annual mammogram.  This can be ordered by your PCP, gynecology provider, or breast specialist/oncologist.  If you have a breast specialist/oncologist who has recommended such, undergo supplemental screenings for breast cancer.    Colorectal cancer:  Determine with your PCP or, if you have one, your gastroenterology (GI) provider whether you are considered to be at average risk versus increased risk for colorectal cancer.  Based on your risk category and corresponding guideline recommendations and, if applicable, the results of your prior colorectal cancer screening, undergo screenings (which may consist of colonoscopy or other test) for colorectal cancer as recommended by your PCP or GI provider.  With regard to family history, please let me know if any of the following applies, as such could change colorectal cancer screening recommendations for you:  If you learn moving forward that any blood relative of yours has been diagnosed with colorectal cancer.  If a first-degree relative (i.e., your parent, sibling, or child) has a colorectal polyp history including any of the following characteristics:  Adenoma with high-grade dysplasia  Adenoma or sessile serrated polyp/adenoma 1 cm or larger in size  Villous or tubulovillous adenoma  Traditional serrated adenoma (TSA)  Sessile serrated lesion/polyp/adenoma with any dysplasia  Cumulatively 10 or more polyps    Prostate cancer (males only):  Determine with your PCP or, if you have one, your urology provider whether you are considered to be at average risk versus increased risk for prostate cancer.   Based on your risk category and corresponding guideline recommendations and, if applicable, the results of your prior prostate cancer screening, undergo screenings for prostate cancer, which include prostate-specific antigen (PSA) testing with or without digital rectal exam (TWAN).    Skin cancer:  Undergo total-body skin examination (TBSE) with a dermatology provider annually or, if recommended by your dermatology provider, more often.    Pancreatic cancer:  If you have been told you are at increased risk of developing pancreatic cancer based on significant family history and/or other risk factors, consult with a pancreas specialist about undergoing annual screening for pancreatic cancer, which often consists of MRI/MR cholangiopancreatography (MRCP) in alternation with annual endoscopic ultrasound (EUS) of the pancreas.    Lung cancer:  If you have a 20-pack-year smoking history or greater, shared patient/provider decision-making regarding low-dose CT scan (LDCT) is recommended starting at age 50.  Please let me know if you do have at least a 20-pack-year smoking history or if you are unsure.     Other cancer:  Undergo screenings/surveillance as recommended by your healthcare providers.    If you are not established with a healthcare specialist listed above, please let me know so I can refer you.    Some information regarding general cancer risk reduction:  It is recommended to avoid tobacco use; be physically active; maintain a healthy weight; eat a diet rich in fruits, vegetables, and whole grains and low in saturated/trans fat, red meat, and processed meat; limit alcohol consumption (zero is best); protect against sexually transmitted infections; protect against the sun, and avoid tanning beds; and get regular screenings for cancers as recommended by your healthcare team.    Regarding Your Relatives    Your relatives also may be at increased risk for certain cancers, depending upon their own personal and family  history; therefore, it is important that they, too, ensure that their own healthcare providers are aware of and up-to-date on their personal and family history so that their medical management, including cancer screenings, can be based in part off of this information.      Additionally, it is possible for your relatives to have hereditary cancer susceptibility gene mutations even if/when you have negative genetic testing.      Your relatives should speak with a healthcare provider about their cancer risks and whether genetic counseling and potentially testing may be indicated for them.  Anyone interested in pursuing cancer genetic counseling/testing may contact the [facility] at [phone] to schedule an appointment or may visit [email or web address] to locate a genetic specialist near them.    Follow-up    Please continue to follow up with all healthcare providers as directed.    Moving forward, please update me with any changes to--or new information learned about--your personal or family history and with any relative's genetic testing results.  If at any point you wish to pursue hereditary cancer genetic testing, as I do recommend for you from a clinical standpoint, please reach out so I can facilitate that for you; otherwise, please follow up with me in 6 months to re-discuss the matter.  In the meantime, please don't hesitate to reach out with any questions or concerns.        Follow-up:  Follow up in about 6 months (around 6/19/2025).  Follow up sooner as needed/desired, such as if genetic testing is desired.    Questions were encouraged and answered to the patient's satisfaction, and he verbalized understanding of the information and agreement with the plan.  Mariela Coello that pertinent information will be accessible in this visit note for his review.        Preston Woods, DNP, APRN, FNP-BC, AOCNP, CGRA  Nurse Practitioner, Cancer Genetics  Department of Hematology and Oncology  [facility]  [facility]        CC:  Dr. Eliseo Maravilla         Routed to Cancer Genetics Staff:  - Please place recall for 6 months.        Portions of the record may have been created with voice-recognition software.  Occasional wrong-word or sound-a-like substitutions may have occurred due to the inherent limitations of voice-recognition software.  Read the chart carefully, and recognize, using context, where substitutions have occurred.

## 2024-12-19 NOTE — TELEPHONE ENCOUNTER
Advised pt of OOP cost information per [facility].  Pt wishes to proceed with sample collection for germline testing through [facility] tomorrow at 1pm.    Pt stated OK to discuss his health and appointment information with his daughter Thi.

## 2024-12-20 ENCOUNTER — HOSPITAL ENCOUNTER (OUTPATIENT)
Dept: RADIOLOGY | Facility: HOSPITAL | Age: 69
Discharge: HOME | End: 2024-12-20
Attending: STUDENT IN AN ORGANIZED HEALTH CARE EDUCATION/TRAINING PROGRAM
Payer: MEDICARE

## 2024-12-20 ENCOUNTER — LAB VISIT (OUTPATIENT)
Dept: LAB | Facility: HOSPITAL | Age: 69
End: 2024-12-20
Payer: MEDICARE

## 2024-12-20 DIAGNOSIS — C61 PROSTATE CANCER: ICD-10-CM

## 2024-12-20 DIAGNOSIS — Z80.42 FAMILY HISTORY OF PROSTATE CANCER: ICD-10-CM

## 2024-12-20 LAB — MISCELLANEOUS TEST NAME: NORMAL

## 2024-12-20 PROCEDURE — 36415 COLL VENOUS BLD VENIPUNCTURE: CPT | Performed by: NURSE PRACTITIONER

## 2024-12-20 PROCEDURE — 78815 PET IMAGE W/CT SKULL-THIGH: CPT | Mod: TC,PS

## 2024-12-20 PROCEDURE — A9595 HC PIFLUFOLASTAT F-18, DX, PER 1 MCI: HCPCS | Mod: TB | Performed by: STUDENT IN AN ORGANIZED HEALTH CARE EDUCATION/TRAINING PROGRAM

## 2024-12-20 PROCEDURE — 78815 PET IMAGE W/CT SKULL-THIGH: CPT | Mod: 26,PI,, | Performed by: NUCLEAR MEDICINE

## 2024-12-20 RX ADMIN — PIFLUFOLASTAT F-18 10.27 MILLICURIE: 80 INJECTION INTRAVENOUS at 03:12

## 2024-12-23 ENCOUNTER — PATIENT MESSAGE (OUTPATIENT)
Dept: HEMATOLOGY/ONCOLOGY | Facility: CLINIC | Age: 69
End: 2024-12-23
Payer: MEDICARE

## 2024-12-23 ENCOUNTER — PATIENT MESSAGE (OUTPATIENT)
Dept: UROLOGY | Facility: CLINIC | Age: 69
End: 2024-12-23
Payer: MEDICARE

## 2024-12-30 ENCOUNTER — HOSPITAL ENCOUNTER (OUTPATIENT)
Dept: RADIOLOGY | Facility: OTHER | Age: 69
Discharge: HOME | End: 2024-12-30
Attending: HOSPITALIST
Payer: MEDICARE

## 2024-12-30 DIAGNOSIS — Z79.818 ANDROGEN DEPRIVATION THERAPY: ICD-10-CM

## 2024-12-30 DIAGNOSIS — Z79.899 LONG TERM CURRENT USE OF THERAPEUTIC DRUG: ICD-10-CM

## 2024-12-30 PROCEDURE — 77080 DXA BONE DENSITY AXIAL: CPT | Mod: TC

## 2025-01-06 ENCOUNTER — LAB VISIT (OUTPATIENT)
Dept: LAB | Facility: HOSPITAL | Age: 70
End: 2025-01-06
Attending: HOSPITALIST
Payer: MEDICARE

## 2025-01-06 ENCOUNTER — OFFICE VISIT (OUTPATIENT)
Dept: HEMATOLOGY/ONCOLOGY | Facility: CLINIC | Age: 70
End: 2025-01-06
Payer: MEDICARE

## 2025-01-06 ENCOUNTER — OFFICE VISIT (OUTPATIENT)
Dept: UROLOGY | Facility: CLINIC | Age: 70
End: 2025-01-06
Payer: MEDICARE

## 2025-01-06 ENCOUNTER — PATIENT MESSAGE (OUTPATIENT)
Dept: HEMATOLOGY/ONCOLOGY | Facility: CLINIC | Age: 70
End: 2025-01-06
Payer: MEDICARE

## 2025-01-06 ENCOUNTER — INFUSION (OUTPATIENT)
Dept: INFUSION THERAPY | Facility: HOSPITAL | Age: 70
End: 2025-01-06
Payer: MEDICARE

## 2025-01-06 VITALS
HEART RATE: 65 BPM | HEIGHT: 67 IN | DIASTOLIC BLOOD PRESSURE: 69 MMHG | SYSTOLIC BLOOD PRESSURE: 136 MMHG | WEIGHT: 172.19 LBS | BODY MASS INDEX: 27.02 KG/M2

## 2025-01-06 VITALS
HEIGHT: 67 IN | WEIGHT: 172.19 LBS | BODY MASS INDEX: 27.02 KG/M2 | DIASTOLIC BLOOD PRESSURE: 69 MMHG | SYSTOLIC BLOOD PRESSURE: 136 MMHG | HEART RATE: 65 BPM

## 2025-01-06 DIAGNOSIS — Z79.818 ANDROGEN DEPRIVATION THERAPY: ICD-10-CM

## 2025-01-06 DIAGNOSIS — N13.8 BPH WITH URINARY OBSTRUCTION: ICD-10-CM

## 2025-01-06 DIAGNOSIS — N52.01 ERECTILE DYSFUNCTION DUE TO ARTERIAL INSUFFICIENCY: ICD-10-CM

## 2025-01-06 DIAGNOSIS — C61 PROSTATE CANCER: ICD-10-CM

## 2025-01-06 DIAGNOSIS — C61 PROSTATE CANCER: Primary | ICD-10-CM

## 2025-01-06 DIAGNOSIS — Z79.899 LONG TERM CURRENT USE OF THERAPEUTIC DRUG: ICD-10-CM

## 2025-01-06 DIAGNOSIS — N40.1 BPH WITH URINARY OBSTRUCTION: ICD-10-CM

## 2025-01-06 DIAGNOSIS — R35.0 URINARY FREQUENCY: ICD-10-CM

## 2025-01-06 LAB
ALBUMIN SERPL BCP-MCNC: 3.8 G/DL (ref 3.5–5.2)
ALP SERPL-CCNC: 67 U/L (ref 40–150)
ALT SERPL W/O P-5'-P-CCNC: 21 U/L (ref 10–44)
ANION GAP SERPL CALC-SCNC: 9 MMOL/L (ref 8–16)
AST SERPL-CCNC: 24 U/L (ref 10–40)
BILIRUB SERPL-MCNC: 0.3 MG/DL (ref 0.1–1)
BUN SERPL-MCNC: 18 MG/DL (ref 8–23)
CALCIUM SERPL-MCNC: 9.4 MG/DL (ref 8.7–10.5)
CHLORIDE SERPL-SCNC: 108 MMOL/L (ref 95–110)
CO2 SERPL-SCNC: 20 MMOL/L (ref 23–29)
COMPLEXED PSA SERPL-MCNC: 2.2 NG/ML (ref 0–4)
CREAT SERPL-MCNC: 0.8 MG/DL (ref 0.5–1.4)
ERYTHROCYTE [DISTWIDTH] IN BLOOD BY AUTOMATED COUNT: 11.8 % (ref 11.5–14.5)
EST. GFR  (NO RACE VARIABLE): >60 ML/MIN/1.73 M^2
GLUCOSE SERPL-MCNC: 81 MG/DL (ref 70–110)
HCT VFR BLD AUTO: 41.7 % (ref 40–54)
HGB BLD-MCNC: 13.4 G/DL (ref 14–18)
IMM GRANULOCYTES # BLD AUTO: 0.01 K/UL (ref 0–0.04)
MCH RBC QN AUTO: 32.4 PG (ref 27–31)
MCHC RBC AUTO-ENTMCNC: 32.1 G/DL (ref 32–36)
MCV RBC AUTO: 101 FL (ref 82–98)
NEUTROPHILS # BLD AUTO: 1.7 K/UL (ref 1.8–7.7)
PLATELET # BLD AUTO: 128 K/UL (ref 150–450)
PMV BLD AUTO: 12.1 FL (ref 9.2–12.9)
POTASSIUM SERPL-SCNC: 4.3 MMOL/L (ref 3.5–5.1)
PROT SERPL-MCNC: 7.5 G/DL (ref 6–8.4)
RBC # BLD AUTO: 4.14 M/UL (ref 4.6–6.2)
SODIUM SERPL-SCNC: 137 MMOL/L (ref 136–145)
WBC # BLD AUTO: 4.84 K/UL (ref 3.9–12.7)

## 2025-01-06 PROCEDURE — 3008F BODY MASS INDEX DOCD: CPT | Mod: CPTII,S$GLB,, | Performed by: STUDENT IN AN ORGANIZED HEALTH CARE EDUCATION/TRAINING PROGRAM

## 2025-01-06 PROCEDURE — 3008F BODY MASS INDEX DOCD: CPT | Mod: CPTII,S$GLB,, | Performed by: HOSPITALIST

## 2025-01-06 PROCEDURE — 3075F SYST BP GE 130 - 139MM HG: CPT | Mod: CPTII,S$GLB,, | Performed by: HOSPITALIST

## 2025-01-06 PROCEDURE — 1101F PT FALLS ASSESS-DOCD LE1/YR: CPT | Mod: CPTII,S$GLB,, | Performed by: HOSPITALIST

## 2025-01-06 PROCEDURE — 3288F FALL RISK ASSESSMENT DOCD: CPT | Mod: CPTII,S$GLB,, | Performed by: HOSPITALIST

## 2025-01-06 PROCEDURE — 1101F PT FALLS ASSESS-DOCD LE1/YR: CPT | Mod: CPTII,S$GLB,, | Performed by: STUDENT IN AN ORGANIZED HEALTH CARE EDUCATION/TRAINING PROGRAM

## 2025-01-06 PROCEDURE — 99999 PR PBB SHADOW E&M-EST. PATIENT-LVL III: CPT | Mod: PBBFAC,,, | Performed by: STUDENT IN AN ORGANIZED HEALTH CARE EDUCATION/TRAINING PROGRAM

## 2025-01-06 PROCEDURE — 99215 OFFICE O/P EST HI 40 MIN: CPT | Mod: S$GLB,,, | Performed by: HOSPITALIST

## 2025-01-06 PROCEDURE — 99215 OFFICE O/P EST HI 40 MIN: CPT | Mod: S$GLB,,, | Performed by: STUDENT IN AN ORGANIZED HEALTH CARE EDUCATION/TRAINING PROGRAM

## 2025-01-06 PROCEDURE — 3288F FALL RISK ASSESSMENT DOCD: CPT | Mod: CPTII,S$GLB,, | Performed by: STUDENT IN AN ORGANIZED HEALTH CARE EDUCATION/TRAINING PROGRAM

## 2025-01-06 PROCEDURE — 96402 CHEMO HORMON ANTINEOPL SQ/IM: CPT

## 2025-01-06 PROCEDURE — 1126F AMNT PAIN NOTED NONE PRSNT: CPT | Mod: CPTII,S$GLB,, | Performed by: STUDENT IN AN ORGANIZED HEALTH CARE EDUCATION/TRAINING PROGRAM

## 2025-01-06 PROCEDURE — 36415 COLL VENOUS BLD VENIPUNCTURE: CPT | Performed by: HOSPITALIST

## 2025-01-06 PROCEDURE — G2211 COMPLEX E/M VISIT ADD ON: HCPCS | Mod: S$GLB,,, | Performed by: HOSPITALIST

## 2025-01-06 PROCEDURE — 85027 COMPLETE CBC AUTOMATED: CPT | Performed by: HOSPITALIST

## 2025-01-06 PROCEDURE — 3075F SYST BP GE 130 - 139MM HG: CPT | Mod: CPTII,S$GLB,, | Performed by: STUDENT IN AN ORGANIZED HEALTH CARE EDUCATION/TRAINING PROGRAM

## 2025-01-06 PROCEDURE — 99999 PR PBB SHADOW E&M-EST. PATIENT-LVL III: CPT | Mod: PBBFAC,,, | Performed by: HOSPITALIST

## 2025-01-06 PROCEDURE — 3078F DIAST BP <80 MM HG: CPT | Mod: CPTII,S$GLB,, | Performed by: HOSPITALIST

## 2025-01-06 PROCEDURE — 84153 ASSAY OF PSA TOTAL: CPT | Performed by: HOSPITALIST

## 2025-01-06 PROCEDURE — 3078F DIAST BP <80 MM HG: CPT | Mod: CPTII,S$GLB,, | Performed by: STUDENT IN AN ORGANIZED HEALTH CARE EDUCATION/TRAINING PROGRAM

## 2025-01-06 PROCEDURE — 80053 COMPREHEN METABOLIC PANEL: CPT | Performed by: HOSPITALIST

## 2025-01-06 PROCEDURE — 63600175 PHARM REV CODE 636 W HCPCS: Mod: JZ,TB | Performed by: HOSPITALIST

## 2025-01-06 PROCEDURE — 1126F AMNT PAIN NOTED NONE PRSNT: CPT | Mod: CPTII,S$GLB,, | Performed by: HOSPITALIST

## 2025-01-06 RX ORDER — ENEMA 19; 7 G/133ML; G/133ML
1 ENEMA RECTAL ONCE
Qty: 1 ENEMA | Refills: 0 | Status: SHIPPED | OUTPATIENT
Start: 2025-01-06 | End: 2025-01-06

## 2025-01-06 RX ORDER — TAMSULOSIN HYDROCHLORIDE 0.4 MG/1
0.4 CAPSULE ORAL DAILY
Qty: 90 CAPSULE | Refills: 4 | Status: SHIPPED | OUTPATIENT
Start: 2025-01-06

## 2025-01-06 RX ORDER — VIT C/E/ZN/COPPR/LUTEIN/ZEAXAN 250MG-90MG
1000 CAPSULE ORAL DAILY
Qty: 30 CAPSULE | Refills: 11 | Status: SHIPPED | OUTPATIENT
Start: 2025-01-06 | End: 2025-01-09 | Stop reason: SDUPTHER

## 2025-01-06 RX ORDER — SODIUM PHOSPHATE,MONO-DIBASIC 19G-7G/197
1 ENEMA (ML) RECTAL ONCE
Qty: 1 ENEMA | Refills: 0 | Status: SHIPPED | OUTPATIENT
Start: 2025-01-06 | End: 2025-01-06

## 2025-01-06 RX ORDER — DICLOFENAC SODIUM 50 MG/1
50 TABLET, DELAYED RELEASE ORAL
COMMUNITY
Start: 2024-08-15

## 2025-01-06 RX ORDER — CIPROFLOXACIN 500 MG/1
500 TABLET ORAL ONCE
Qty: 1 TABLET | Refills: 0 | Status: SHIPPED | OUTPATIENT
Start: 2025-01-06 | End: 2025-01-06

## 2025-01-06 RX ORDER — CIPROFLOXACIN 500 MG/1
TABLET ORAL
Qty: 1 TABLET | Refills: 0 | Status: SHIPPED | OUTPATIENT
Start: 2025-01-06

## 2025-01-06 RX ADMIN — LEUPROLIDE ACETATE 22.5 MG: KIT at 04:01

## 2025-01-06 NOTE — ASSESSMENT & PLAN NOTE
No evidence of metastatic disease. May be a candidate for salvage cryoablation. Seen by Dr. Barrios today. Will proceed with biopsy this week for further treatment planning. In the interim will start ADT with Lupron today. If able to proceed with cryo, would limit finite single agent ADT to 6 months. Otherwise conider ADT + enzalutamide per [facility].  - Con't ADT + bicalutamide  - Lupron today  - Can stop bicaluamide after 1 month  - Con't vitamin D  - Biopsy later this week; consider cryoablation  - FU with me 3 months

## 2025-01-06 NOTE — PROGRESS NOTES
[facility]: New patient visit  Best Contact Phone Number(s): [phone] (home)       Cancer/Stage/TNM:    Cancer Staging   No matching staging information was found for the patient.        Reason for visit:  Elevated PSA with history of prostate cancer    Molecular:  Germline Testin24 - Negative  Somatic Testing: N/A    Densitometry:  DEXA 24: Normal    Treatment History:   24 -     ADT     HPI:   Mode Gabriel is a 69 y.o. male with metachronous recurrence of prostate cancer. Initially diagnosed  at [facility], underwent short course course ADT with definitive EBRT. Ultimately lost to follow up. Represented to PCP 10/2024 with PSA elevated to 5.98. Increased to 10.2 on 24. PSMA PET CT 2024 without metastatic disease. He started ADT 2024. He presents to medical oncology clinic for initial evaluation.    Interval Events:    Started bicalutamide 24. Has ongoing urinary urgency but no incontinence. Nocturia persistent at 3x per night. No weak flow. Energy is good. No signficant hot flashes. No CP, SOB or cough. Appetite is good. Reports normal bowel movements. Appettie is good and weight is up.     Lab Results   Component Value Date    PSADIAG 2.2 2025    PSADIAG 10.0 (H) 2024    PSADIAG 0.29 2021         History has been obtained by chart review and discussion with the patient.     Oncology History   Prostate cancer    Initial Diagnosis    2012: Initial diagnosis and treatment at [facility]. Reportedly Milner 7. Received hormone therapy and short course ADT.      2019 Tumor Markers    2019 -- PSA 0.3  2020 -- PSA 0.3  3/5/2021 -- PSA 0.29     10/2024 Tumor Markers    10/07/2024: PSA 5.98  2024: PSA 10.2     2024 Imaging Significant Findings    24 PSMA PET   Impression:  - In this patient with remote history of prostate cancer, there is mild heterogeneous tracer uptake within the prostate.  Attention to  "follow with rising PSA. No tracer avid lesions elsewhere to suggest metastatic disease.  - Patchy ground-glass opacities throughout the lungs bilaterally, suggestive of infectious/inflammatory etiology.           Past Medical History:   Diagnosis Date    Prostate cancer          Past Surgical History:   Procedure Laterality Date    BACK SURGERY N/A 2000    [facility]         Review of patient's allergies indicates:  No Known Allergies      Current Outpatient Medications   Medication Sig Dispense Refill    bicalutamide (CASODEX) 50 MG Tab Take 1 tablet (50 mg total) by mouth once daily. 30 tablet 0    cholecalciferol, vitamin D3, (VITAMIN D3) 25 mcg (1,000 unit) capsule Take 1 capsule (1,000 Units total) by mouth once daily. 30 capsule 11    ciprofloxacin HCl (CIPRO) 500 MG tablet 1 pill one hour before prostate biopsy 1 tablet 0    diclofenac (VOLTAREN) 50 MG EC tablet Take 50 mg by mouth.      sildenafiL (VIAGRA) 100 MG tablet Take 1 tablet (100 mg total) by mouth daily as needed for Erectile Dysfunction. 10 tablet 12    sodium phosphates (FLEET ENEMA) 19-7 gram/118 mL Enem Place 1 enema rectally once. Use at home the am of the procedure for 1 dose 1 enema 0    tamsulosin (FLOMAX) 0.4 mg Cap Take 1 capsule (0.4 mg total) by mouth once daily. 90 capsule 4     No current facility-administered medications for this visit.     Facility-Administered Medications Ordered in Other Visits   Medication Dose Route Frequency Provider Last Rate Last Admin    leuprolide (LUPRON) injection 22.5 mg  22.5 mg Intramuscular 1 time in Clinic/HOD Eliseo Maravilla MD            Objective:      Physical Exam:   /69 (BP Location: Left arm, Patient Position: Sitting)   Pulse 65   Ht 5' 7" (1.702 m)   Wt 78.1 kg (172 lb 2.9 oz)   BMI 26.97 kg/m²       ECOG Performance status: (0) Fully active, able to carry on all predisease performance without restriction     Physical Exam  Constitutional:       General: He is not in acute distress.    " " Appearance: Normal appearance.   HENT:      Head: Normocephalic.   Eyes:      General: No scleral icterus.     Extraocular Movements: Extraocular movements intact.      Conjunctiva/sclera: Conjunctivae normal.   Cardiovascular:      Rate and Rhythm: Normal rate.   Pulmonary:      Effort: Pulmonary effort is normal. No respiratory distress.   Abdominal:      General: There is no distension.      Palpations: Abdomen is soft.   Skin:     General: Skin is warm and dry.   Neurological:      Mental Status: He is alert and oriented to person, place, and time.      Motor: No weakness.   Psychiatric:         Mood and Affect: Mood normal.         Behavior: Behavior normal.         Thought Content: Thought content normal.          Recent Labs:   Lab Results   Component Value Date    WBC 4.84 01/06/2025    RBC 4.14 (L) 01/06/2025    HGB 13.4 (L) 01/06/2025    HCT 41.7 01/06/2025     (L) 01/06/2025     01/06/2025    K 4.3 01/06/2025     01/06/2025    CO2 20 (L) 01/06/2025    GLU 81 01/06/2025    BUN 18 01/06/2025    CREATININE 0.8 01/06/2025    CALCIUM 9.4 01/06/2025    BILITOT 0.3 01/06/2025    AST 24 01/06/2025    ALT 21 01/06/2025          Lab Results   Component Value Date    PSADIAG 2.2 01/06/2025    PSADIAG 10.0 (H) 12/11/2024    PSADIAG 0.29 03/05/2021        Cardiovascular Screening:  Primary care physician: Evens Martins MD      The ASCVD Risk score (Mariposa DK, et al., 2019) failed to calculate for the following reasons:    Cannot find a previous HDL lab    Cannot find a previous total cholesterol lab    EKG: No results found for this or any previous visit.    Body mass index is 26.97 kg/m².    No results found for: "LABA1C", "CHOL", "LDLCALC", "HDL", "TRIG", "HGBA1C"       Bone Health    Lab Results   Component Value Date    LUIHLOFU60CD 24 (L) 12/11/2024        No results found for this or any previous visit.         PSMA PET IMAGING+     No results found for this or any previous " visit.       I have personally reviewed the above imaging.     Path:   Reviewed pathology as documented above.      Diagnoses:     1. Prostate cancer    2. Androgen deprivation therapy    3. Long term current use of therapeutic drug            Assessment and Plan:     1. Prostate cancer  Overview:  Metachronous recurrence of prostate cancer. Initially diagnosed 2012 at [facility], underwent short course course ADT with definitive EBRT. Ultimately lost to follow up. Represented to PCP 10/2024 with pSA elevated to 5.98. PSMA PET CT 12/2024 without metastatic disease. He started ADT 01/2024.    Assessment & Plan:  No evidence of metastatic disease. May be a candidate for salvage cryoablation. Seen by Dr. Barrios today. Will proceed with biopsy this week for further treatment planning. In the interim will start ADT with Lupron today. If able to proceed with cryo, would limit finite single agent ADT to 6 months. Otherwise conider ADT + enzalutamide per EMBARK.  - Con't ADT + bicalutamide  - Lupron today  - Can stop bicaluamide after 1 month  - Con't vitamin D  - Biopsy later this week; consider cryoablation  - FU with me 3 months      2. Androgen deprivation therapy  -     cholecalciferol, vitamin D3, (VITAMIN D3) 25 mcg (1,000 unit) capsule; Take 1 capsule (1,000 Units total) by mouth once daily.  Dispense: 30 capsule; Refill: 11    3. Long term current use of therapeutic drug  -     cholecalciferol, vitamin D3, (VITAMIN D3) 25 mcg (1,000 unit) capsule; Take 1 capsule (1,000 Units total) by mouth once daily.  Dispense: 30 capsule; Refill: 11    Other orders  -     Cancel: leuprolide (LUPRON) injection 22.5 mg            Follow up:   Route Chart for Scheduling    Med Onc Chart Routing      Follow up with physician 3 months.   Follow up with MICHAEL    Infusion scheduling note    Injection scheduling note Lupron 3 months   Labs CBC, CMP and PSA   Scheduling:  Preferred lab:  Lab interval:     Imaging    Pharmacy appointment    Other  referrals                     Supportive Plan Information  OP PROSTATE LEUPROLIDE Q3MO Eliseo Maravilla MD   Associated Diagnosis: Prostate cancer   noted on 12/11/2024   Line of treatment: First Line   Treatment goal: Palliative     Upcoming Treatment Dates - OP PROSTATE LEUPROLIDE Q3MO    3/31/2025       Chemotherapy       leuprolide (LUPRON) injection 22.5 mg  6/23/2025       Chemotherapy       leuprolide (LUPRON) injection 22.5 mg  9/15/2025       Chemotherapy       leuprolide (LUPRON) injection 22.5 mg  12/8/2025       Chemotherapy       leuprolide (LUPRON) injection 22.5 mg         The above information has been reviewed with the patient and all questions have been answered to their apparent satisfaction.  They understand that they can call the clinic with any questions.    Eliseo Maravilla MD MPH  Staff Physician     [facility] [address] [address]  Email: [email or web address]  Phone: o) [phone] (c) [phone]

## 2025-01-06 NOTE — PROGRESS NOTES
[facility]  Urologic Oncology Clinic Note        Date of Service: 1/6/2025        Chief Complaint/Reason for Consultation: Elevated PSA     Requesting Provider:   No referring provider defined for this encounter.      History of Present Illness:   Patient 69 y.o. male presents with history of prostrate cancer, BPH, ED presenting to clinic due to elevated PSA. His most recent PSA in 2024 was 5.98. Pt reports he was diagnosed with prostate cancer in 2017 at [facility] via MRI. Pt was treated with what appears to be hormone therapy and radiation therapy. He has had normal PSA values since that visit but his most recent PSA is elevated. Pt reports his father having prostrate cancer. He smoked 1/2 PPD for 3 years and quit smoking 4 years ago.     All information was gathered from the patient. He was not able to produce any records of his care form [facility].    Blood thinners: None  No Hx of TIA, MI, and CVA   Abdominal surgeries: None    Returns today to review MRI prostate and PSMA PET. Since last visit he saw Dr. Maravilla and was started on bicalutamide and Lupron. PSADT 9.3 months. He also underwent genetic testing but does not yet have results.  He reports nocturia x 2-3 and frequency.       Urologic History:     11/12/2019 -- PSA 0.3  12/24/2020 -- PSA 0.3  3/5/2021 -- PSA 0.29  10/7/2024 -- PSA 5.98  12/11/2024 -- PSA 10.0  12/11/2024 -- MRI Prostate -- PIRADS 2  12/20/2024 -- PSMA PET -- Tracer uptake in prostate. No evidence of metastatic disease          Review of patient's allergies indicates:  No Known Allergies     Past Medical History:   Diagnosis Date    Prostate cancer       Past Surgical History:   Procedure Laterality Date    BACK SURGERY N/A 2000    [facility]      Family History   Problem Relation Name Age of Onset    Prostate cancer Father          cause of death    Prostate cancer Brother      Prostate cancer Brother      Prostate cancer Brother Rajinder     Breast cancer Neg Hx      Ovarian cancer  "Neg Hx      Pancreatic cancer Neg Hx        Social History     Tobacco Use    Smoking status: Former     Types: Cigarettes    Smokeless tobacco: Never   Substance Use Topics    Alcohol use: Yes     Alcohol/week: 1.0 standard drink of alcohol     Types: 1 Cans of beer per week        Review of Systems   Constitutional:  Negative for fever.   Genitourinary: Negative.      Review of Systems   Constitutional:  Negative for fever.   Genitourinary: Negative.            OBJECTIVE:     Vitals:    25 1330   BP: 136/69   BP Location: Right arm   Patient Position: Sitting   Pulse: 65   Weight: 78.1 kg (172 lb 2.9 oz)   Height: 5' 7" (1.702 m)            General Appearance: Alert, cooperative, no distress  Head: Normocephalic  Eyes: Clear conjunctiva  Neck: No obvious LND or JVD  Lungs: Normal chest excursion, no accessory muscle use  Chest: Regular rate rhythm by palpation, no pedal edema  Abdomen: Soft, non-tender, non-distended, no surgical scars, no hernias   Male Genitalia: normal phallus, no inguinal hernias, b/l testis normal shape and size w/o masses, no varicoceles   TWAN: Normal, soft.   Extremities: Atraumatic   Lymph Nodes: No appreciable lymph adenopathy  Neurologic: No gross gait, motor or sensory deficits            LAB:      All laboratory values listed below was/were independently reviewed with patient at this clinic visit.    Lab Results   Component Value Date    PSADIAG 10.0 (H) 2024    PSADIAG 0.29 2021           [id number]  MRI Prostate    CLINICAL HISTORY:  Prostate cancer, assess treatment response;  Malignant neoplasm of prostate     Additional history: None provided.     TECHNIQUE:  Multiparametric MRI of the prostate/pelvis performed on a 3T scanner with phase pelvic coil. Multiplanar, multisequence images including high resolution, small field-of-view T2-WI; axial diffusion weighted images with multiple B-values and creation of ADC-maps; and dynamic contrast enhanced " T1-weighted images through the prostate were obtained before, during, and after the administration of 10 cc intravenous gadolinium.     COMPARISON:  None.     FINDINGS:  Previous biopsy:  None     PSA: 10.0 ng/mL 12/11/2024     Prior therapy: ADT and radiation therapy.     Prostate: 4.5 x 3.5 x 3.8 cm corresponding to a computed volume of 29.76 cc.     Prostate density: 0.34     Peripheral zone: Diffuse heterogeneous intermediate T2 signal throughout the peripheral zone that may represent post prostatitis or post radiation change.     Transitional zone: Normal appearance, score 1.     Seminal vesicles: Scarred appearance with dark T2 signal.     Adjacent Organ Involvement: No evidence for urinary bladder or rectal invasion.     Lymphadenopathy: None.     Other Findings: Degenerative changes of the spine.     Impression:     Diffuse heterogeneous intermediate T2 signal throughout the peripheral zone that may represent post-prostatitis or postradiation change.  Scarred appearance of the seminal vesicles likely secondary to prior radiation.     No discrete lesion.     Overall Assessment: PI-RADS 2 - Low (clinically significant cancer is unlikely to be present)     Number of targets created for potential MR/US fusion biopsy     Peripheral zone: 0     Transition zone: 0     Electronically signed by resident: Yeison Bullock  Date:                                            12/11/2024  Time:                                           16:25     Electronically signed by:Eric Browning Jr  Date:                                            12/11/2024  Time:                                           16:39      12/20/2024  PSMA PET    CLINICAL HISTORY:  prostate cancer;  Malignant neoplasm of prostate     69-year-old male with history of prostate cancer 2012 status post medical therapy and radiation.     PSA 10.0     TECHNIQUE:  Approximately 60 minutes following the intravenous injection of 10.27 mCi F-18 piflufolastat, PET  images were acquired from the proximal thighs to the skull vertex. CT images were also acquired for attenuation correction and anatomic localization and performed without oral or IV contrast.     COMPARISON:  MRI prostate 12/11/2024     FINDINGS:  In the head and neck, there is physiologic uptake in the lacrimal and salivary glands, and there are no tracer avid lesions suspicious for malignancy.     In the chest, there are no tracer avid lesions suspicious for malignancy.     In the abdomen and pelvis, there is physiologic distribution in the liver, spleen, bowel, and genitourinary tract.  There is mild heterogeneous tracer uptake within the prostate with SUV max 2.6 (image 251).  Specifically, there are no pathologically enlarged or tracer avid pelvic or retroperitoneal lymph nodes.     In the bones, there is physiologic uptake in the bone marrow, and there are no tracer avid lesions suspicious for malignancy.     Additional CT findings: Mucosal thickening of the right maxillary sinus.  Patchy ground-glass opacities throughout the lungs bilaterally, suggestive of infectious/inflammatory etiology.  Multivessel coronary artery calcifications.  Osseous degenerative changes.     Impression:     In this patient with remote history of prostate cancer, there is mild heterogeneous tracer uptake within the prostate.  Attention to follow with rising PSA. No tracer avid lesions elsewhere to suggest metastatic disease.     Patchy ground-glass opacities throughout the lungs bilaterally, suggestive of infectious/inflammatory etiology.     Electronically signed by resident: Gali Mercado  Date:                                            12/20/2024  Time:                                           16:51     Electronically signed by:Althea Davis  Date:                                            12/20/2024  Time:                                           17:03      ASSESSMENT/PLAN:     68 yo M w presumed hx of prostate  cancer treated with ADT + XRT in 2017 here with elevated PSA.    Plan:     Prostate Cancer, radio-recurrent    While his MRI does not show a target lesion, he has tracer uptake in the prostate on PSMA, as well as rising PSA s/p radiation and ADT. We discussed options for management including ADT, which he has begun with Dr. Maravilla. Also discussed salvage cryotherapy and possible risks, including but not limited to urethral and rectal injury. We also briefly discussed the risks of salvage RALP. WE discussed that without an intervention he will remain on hormones for life, however if he chose to have a salvage intervention, he may be able to discontinue hormone therapy in the future. Discussed that men who undergo salvage cryoablation may have better and more durable disease free survival off ADT.     Plan for prostate biopsy to determine need for whole or focal salvage ablation.    He will continue ADT with Dr. Maravilla for now.      Nocturia  - Will start Flomax    Pj Storey MD  Urology PGY-3  [facility]    Attending Note:  I have personally seen and examined the patient and agree with the documentation and plan above as described by the resident.     Duane Barrios MD  Urologic Oncology  P: [phone]

## 2025-01-08 ENCOUNTER — TELEPHONE (OUTPATIENT)
Dept: UROLOGY | Facility: CLINIC | Age: 70
End: 2025-01-08
Payer: MEDICARE

## 2025-01-09 ENCOUNTER — PROCEDURE VISIT (OUTPATIENT)
Dept: UROLOGY | Facility: CLINIC | Age: 70
End: 2025-01-09
Payer: MEDICARE

## 2025-01-09 VITALS
TEMPERATURE: 98 F | HEART RATE: 76 BPM | SYSTOLIC BLOOD PRESSURE: 127 MMHG | RESPIRATION RATE: 20 BRPM | DIASTOLIC BLOOD PRESSURE: 60 MMHG

## 2025-01-09 DIAGNOSIS — Z79.899 LONG TERM CURRENT USE OF THERAPEUTIC DRUG: ICD-10-CM

## 2025-01-09 DIAGNOSIS — Z79.818 ANDROGEN DEPRIVATION THERAPY: ICD-10-CM

## 2025-01-09 DIAGNOSIS — C61 PROSTATE CANCER: ICD-10-CM

## 2025-01-09 RX ORDER — LIDOCAINE HYDROCHLORIDE 20 MG/ML
JELLY TOPICAL
Status: COMPLETED | OUTPATIENT
Start: 2025-01-09 | End: 2025-01-09

## 2025-01-09 RX ORDER — LIDOCAINE HYDROCHLORIDE 10 MG/ML
20 INJECTION, SOLUTION INFILTRATION; PERINEURAL
Status: SHIPPED | OUTPATIENT
Start: 2025-01-09

## 2025-01-09 RX ORDER — VIT C/E/ZN/COPPR/LUTEIN/ZEAXAN 250MG-90MG
1000 CAPSULE ORAL DAILY
Qty: 30 CAPSULE | Refills: 11 | Status: SHIPPED | OUTPATIENT
Start: 2025-01-09

## 2025-01-09 RX ORDER — CEFTRIAXONE 1 G/1
1 INJECTION, POWDER, FOR SOLUTION INTRAMUSCULAR; INTRAVENOUS
Status: COMPLETED | OUTPATIENT
Start: 2025-01-09 | End: 2025-01-09

## 2025-01-09 RX ADMIN — CEFTRIAXONE 1 G: 1 INJECTION, POWDER, FOR SOLUTION INTRAMUSCULAR; INTRAVENOUS at 01:01

## 2025-01-09 RX ADMIN — LIDOCAINE HYDROCHLORIDE 10 ML: 20 JELLY TOPICAL at 01:01

## 2025-01-09 NOTE — PROCEDURES
Procedure not performed due to patient having pain w/ probe insertion. Plan to perform in OR @ later date.     -[place]

## 2025-01-09 NOTE — PATIENT INSTRUCTIONS
What to Expect After a Prostate Biopsy    You may have mild bleeding from the rectum or urine for about 1 week to 1 month, or in your ejaculate for several months. This bleeding is normal and expected, and it will stop. You may have mild discomfort in your rectal or urethral area for 24-48 hours.    You cannot do any strenuous lifting, straining, or exercising for 24 hours. You may return to full activity the day after the biopsy.    You may continue to take all your regular medications after the procedure except for the blood thinners.    You may resume all blood-thinning medications once you no longer see any bleeding or whenever your physician prescribing the medication says it is all right to do so. You may take Tylenol if you have a fever and your temperature is less than 100° F or if you have some discomfort.    You will receive a call from the Urology Department at [facility] with the results of your prostate biopsy within one week.    Signs and Symptoms to Report    Call your [facility] urologist at [phone] if you develop any of the following:  Temperature greater than 101°  F  Inability to urinate  A large amount of bleeding from the rectum or in the urine  Persistent or severe pain    After hours or on weekends, you may reach a urology resident on call at this number: [phone].

## 2025-01-15 ENCOUNTER — TELEPHONE (OUTPATIENT)
Dept: HEMATOLOGY/ONCOLOGY | Facility: CLINIC | Age: 70
End: 2025-01-15
Payer: MEDICARE

## 2025-01-15 NOTE — TELEPHONE ENCOUNTER
----- Message from Coretta Samano NP sent at 1/8/2025  8:46 AM CST -----  Yes, will get him in.    Joselo, Can we please get this patient on my schedule for a prim/onc visit sometime in the next month.  Thanks.  ----- Message -----  From: Eliseo Maravilla MD  Sent: 1/6/2025   4:07 PM CST  To: PINKY Loera - can we get patient in for oncology primary care?

## 2025-01-30 ENCOUNTER — OFFICE VISIT (OUTPATIENT)
Dept: HEMATOLOGY/ONCOLOGY | Facility: CLINIC | Age: 70
End: 2025-01-30
Payer: MEDICARE

## 2025-01-30 VITALS
OXYGEN SATURATION: 97 % | DIASTOLIC BLOOD PRESSURE: 66 MMHG | HEART RATE: 74 BPM | TEMPERATURE: 98 F | BODY MASS INDEX: 27.48 KG/M2 | WEIGHT: 175.06 LBS | HEIGHT: 67 IN | SYSTOLIC BLOOD PRESSURE: 139 MMHG

## 2025-01-30 DIAGNOSIS — Z79.818 ANDROGEN DEPRIVATION THERAPY: ICD-10-CM

## 2025-01-30 DIAGNOSIS — D63.0 ANEMIA IN NEOPLASTIC DISEASE: ICD-10-CM

## 2025-01-30 DIAGNOSIS — C61 PROSTATE CANCER: Primary | ICD-10-CM

## 2025-01-30 PROCEDURE — 99214 OFFICE O/P EST MOD 30 MIN: CPT | Mod: S$GLB,,, | Performed by: NURSE PRACTITIONER

## 2025-01-30 PROCEDURE — 3075F SYST BP GE 130 - 139MM HG: CPT | Mod: CPTII,S$GLB,, | Performed by: NURSE PRACTITIONER

## 2025-01-30 PROCEDURE — G2211 COMPLEX E/M VISIT ADD ON: HCPCS | Mod: S$GLB,,, | Performed by: NURSE PRACTITIONER

## 2025-01-30 PROCEDURE — 1126F AMNT PAIN NOTED NONE PRSNT: CPT | Mod: CPTII,S$GLB,, | Performed by: NURSE PRACTITIONER

## 2025-01-30 PROCEDURE — 3288F FALL RISK ASSESSMENT DOCD: CPT | Mod: CPTII,S$GLB,, | Performed by: NURSE PRACTITIONER

## 2025-01-30 PROCEDURE — 99999 PR PBB SHADOW E&M-EST. PATIENT-LVL III: CPT | Mod: PBBFAC,,, | Performed by: NURSE PRACTITIONER

## 2025-01-30 PROCEDURE — 3078F DIAST BP <80 MM HG: CPT | Mod: CPTII,S$GLB,, | Performed by: NURSE PRACTITIONER

## 2025-01-30 PROCEDURE — 1159F MED LIST DOCD IN RCRD: CPT | Mod: CPTII,S$GLB,, | Performed by: NURSE PRACTITIONER

## 2025-01-30 PROCEDURE — 3008F BODY MASS INDEX DOCD: CPT | Mod: CPTII,S$GLB,, | Performed by: NURSE PRACTITIONER

## 2025-01-30 PROCEDURE — 1101F PT FALLS ASSESS-DOCD LE1/YR: CPT | Mod: CPTII,S$GLB,, | Performed by: NURSE PRACTITIONER

## 2025-01-30 NOTE — PROGRESS NOTES
Primary Care Oncology Visit    Subjective:   Patient ID: Mode Gabriel is a 69 y.o. male.    Chief Complaint: Prostate Cancer    Mode Gabriel comes in today for a primary care/oncology visit.  He was last seen by Dr. Cardona at [facility], but he would like to switch his care to [facility].  He is being followed by Dr. Maravilla for his prostate cancer.  His is on lupron and casodex.      He is feeling well today with no major complaints.  He bikes regularly and eats healthy.  He is accompanied by his daughter today who noticed weight loss which lead to the work up that showed elevated psa.  He lives with his wife.  He denies chest pain, leg swelling, sob, and stomach pain.  He is eating and drinking well.  His bowels are normal.  He does not drink alcohol and he does not smoke.  He did have a cold last week and was coughing, the cough resolved.  Now he just has lingering nasal congestion.      Has a family hx of diabetes, high blood pressure, and prostate cancer    Colonoscopy last year (2024) in March  Flu shot 10/24  Last full set of labs on 10/4/24: lipid panel and A1C (5.0)  normal at this time      Cancer Staging   No matching staging information was found for the patient.    Patient Care Team:  Eliseo Maravilla MD as Consulting Physician (Hematology and Oncology)    Recent Visits  Date Type Provider Dept   01/30/25 Office Visit Coretta Samano NP [facility] Hematology Oncology 3rd Floor   Showing recent visits within past 30 days and meeting all other requirements  Future Appointments  No visits were found meeting these conditions.  Showing future appointments within next 7 days and meeting all other requirements    BP Readings from Last 5 Encounters:   01/30/25 139/66   01/09/25 127/60   01/06/25 136/69   01/06/25 136/69   12/11/24 136/68     Lab Results   Component Value Date    WBC 4.84 01/06/2025    HGB 13.4 (L) 01/06/2025    HCT 41.7 01/06/2025     (H) 01/06/2025     (L) 01/06/2025    ALT 21  01/06/2025    AST 24 01/06/2025      Dexa results: Results for orders placed during the hospital encounter of 12/30/24    DXA Bone Density Axial Skeleton 1 or more sites    Narrative  EXAMINATION:  DXA BONE DENSITY AXIAL SKELETON 1 OR MORE SITES    CLINICAL HISTORY:  Long term (current) use of other agents affecting estrogen receptors and estrogen levels    TECHNIQUE:  DXA scanning was performed over the hips and lumbar spine.  Review of the images confirms satisfactory positioning and technique.    COMPARISON:  None    FINDINGS:  The L1 to L4 vertebral bone mineral density is equal to 1.902 g/cm squared with a T score of 5.4.    The left femoral neck bone mineral density is equal to 1.250 g/cm squared with a T score of 1.4.    The right femoral neck bone mineral density is equal to 1.365 g/cm squared with a T score of 2.3.    There is a 1.5% risk of a major osteoporotic fracture and a 0.1% risk of hip fracture in the next 10 years (FRAX).    Impression  Normal bone mineral density.    Consider FDA approved medical therapies in postmenopausal women and men aged 50 years and older, based on the following:    *A hip or vertebral (clinical or morphometric) fracture  *T score less than or equal to -2.5 at the femoral neck or spine after appropriate evaluation to exclude secondary causes.  *Low bone mass -- also known as osteopenia (T score between -1.0 and -2.5 at the femoral neck or spine) and a 10 year probability of hip fracture greater than or equal to 3% or a 10 year probability of major osteoporosis-related fracture greater than or equal to 20% based on the [place]-adapted WHO algorithm.  *Clinicians judgment and/or patient preference may indicate treatment for people with 10 year fracture probabilities is above or below these levels.      Electronically signed by: Jorge Henderson  Date:    01/01/2025  Time:    17:54      Review of Systems   Constitutional:  Negative for chills, diaphoresis and fever.   HENT:   "Positive for congestion. Negative for ear pain and hearing loss.    Eyes:  Negative for redness and visual disturbance.   Respiratory:  Negative for cough, chest tightness and shortness of breath.    Cardiovascular:  Negative for chest pain and leg swelling.   Gastrointestinal:  Negative for abdominal distention, abdominal pain, constipation and diarrhea.   Genitourinary:  Negative for difficulty urinating and dysuria.   Musculoskeletal:  Negative for gait problem and joint swelling.   Skin:  Negative for rash.   Neurological:  Negative for dizziness, light-headedness and headaches.   Psychiatric/Behavioral:  Negative for confusion. The patient is not nervous/anxious.        Review of patient's allergies indicates:  No Known Allergies    Current Outpatient Medications   Medication Instructions    bicalutamide (CASODEX) 50 mg, Oral, Daily    ciprofloxacin HCl (CIPRO) 500 MG tablet 1 pill one hour before prostate biopsy    diclofenac (VOLTAREN) 50 mg    sildenafiL (VIAGRA) 100 mg, Oral, Daily PRN    tamsulosin (FLOMAX) 0.4 mg, Oral, Daily    VITAMIN D3 1,000 Units, Oral, Daily       Patient Active Problem List    Diagnosis Date Noted    Prostate cancer 12/11/2024       Past Medical History:   Diagnosis Date    Prostate cancer         Past Surgical History:   Procedure Laterality Date    BACK SURGERY N/A 2000    [facility]        Objective:     Vitals:    01/30/25 1409   BP: 139/66   Pulse: 74   Temp: 98 °F (36.7 °C)   TempSrc: Oral   SpO2: 97%   Weight: 79.4 kg (175 lb 0.7 oz)   Height: 5' 7" (1.702 m)   PainSc: 0-No pain       Body mass index is 27.42 kg/m².    Physical Exam  Constitutional:       General: He is not in acute distress.     Appearance: He is well-developed. He is not diaphoretic.   HENT:      Head: Normocephalic and atraumatic.   Eyes:      General: No scleral icterus.     Conjunctiva/sclera: Conjunctivae normal.   Cardiovascular:      Rate and Rhythm: Normal rate and regular rhythm.      Pulses: Normal " pulses.      Heart sounds: Normal heart sounds.   Pulmonary:      Effort: Pulmonary effort is normal. No respiratory distress.      Breath sounds: Normal breath sounds.   Abdominal:      General: Bowel sounds are normal. There is no distension.      Palpations: Abdomen is soft. There is no mass.      Tenderness: There is no abdominal tenderness.      Hernia: No hernia is present.   Musculoskeletal:         General: Normal range of motion.      Cervical back: Normal range of motion and neck supple.      Right lower leg: No edema.      Left lower leg: No edema.   Skin:     General: Skin is warm and dry.   Neurological:      Mental Status: He is alert and oriented to person, place, and time.   Psychiatric:         Behavior: Behavior normal.         Assessment:     1. Prostate cancer    2. Androgen deprivation therapy    3. Anemia in neoplastic disease        Plan:   Intervention: education  Follow up: follow up with primary care oncology     Mode was seen today for prostate cancer.  Diagnoses and all orders for this visit:  1. Prostate cancer (Primary)  2. Androgen deprivation therapy  Continue current medications  Continue to follow with Dr. Maravilla  Last psa 2.2    3. Anemia in neoplastic disease  Will monitor through hem/onc    4. Health maintenance  Colonoscopy utd per pt  A1C and lipid panel last 10/2024  He will check on vaccines      Med Onc Chart Routing      Follow up with physician    Follow up with MICHAEL 3 months. in prim/onc clinic   Infusion scheduling note    Injection scheduling note    Labs    Imaging    Pharmacy appointment    Other referrals                  Total time of this visit, including time spent face to face with patient and/or via video/audio, and also in preparing for today's visit for MDM and documentation. (Medical Decision Making, including consideration of possible diagnoses, management options, complex medical record review, review of diagnostic tests and information, consideration  and discussion of significant complications based on comorbidities, and discussion with providers involved with the care of the patient) is 30 minutes. Greater than 50% was spent face to face with the patient counseling and coordinating care.    Coretta Samano NP    There are no Patient Instructions on file for this visit.

## 2025-03-31 ENCOUNTER — LAB VISIT (OUTPATIENT)
Dept: LAB | Facility: HOSPITAL | Age: 70
End: 2025-03-31
Attending: HOSPITALIST
Payer: MEDICARE

## 2025-03-31 ENCOUNTER — OFFICE VISIT (OUTPATIENT)
Dept: HEMATOLOGY/ONCOLOGY | Facility: CLINIC | Age: 70
End: 2025-03-31
Payer: MEDICARE

## 2025-03-31 ENCOUNTER — INFUSION (OUTPATIENT)
Dept: INFUSION THERAPY | Facility: HOSPITAL | Age: 70
End: 2025-03-31
Payer: MEDICARE

## 2025-03-31 VITALS
HEART RATE: 61 BPM | WEIGHT: 175.94 LBS | TEMPERATURE: 98 F | DIASTOLIC BLOOD PRESSURE: 76 MMHG | HEIGHT: 67 IN | SYSTOLIC BLOOD PRESSURE: 145 MMHG | BODY MASS INDEX: 27.61 KG/M2 | RESPIRATION RATE: 18 BRPM | OXYGEN SATURATION: 97 %

## 2025-03-31 DIAGNOSIS — N13.8 BPH WITH URINARY OBSTRUCTION: ICD-10-CM

## 2025-03-31 DIAGNOSIS — R35.0 URINARY FREQUENCY: ICD-10-CM

## 2025-03-31 DIAGNOSIS — N40.1 BPH WITH URINARY OBSTRUCTION: ICD-10-CM

## 2025-03-31 DIAGNOSIS — Z79.899 LONG TERM CURRENT USE OF THERAPEUTIC DRUG: ICD-10-CM

## 2025-03-31 DIAGNOSIS — C61 PROSTATE CANCER: ICD-10-CM

## 2025-03-31 DIAGNOSIS — C61 PROSTATE CANCER: Primary | ICD-10-CM

## 2025-03-31 LAB
ABSOLUTE NEUTROPHIL COUNT (OHS): 1.71 K/UL (ref 1.8–7.7)
ALBUMIN SERPL BCP-MCNC: 3.7 G/DL (ref 3.5–5.2)
ALP SERPL-CCNC: 68 UNIT/L (ref 40–150)
ALT SERPL W/O P-5'-P-CCNC: 21 UNIT/L (ref 10–44)
ANION GAP (OHS): 5 MMOL/L (ref 8–16)
AST SERPL-CCNC: 29 UNIT/L (ref 11–45)
BILIRUB SERPL-MCNC: 0.4 MG/DL (ref 0.1–1)
BUN SERPL-MCNC: 18 MG/DL (ref 8–23)
CALCIUM SERPL-MCNC: 9.5 MG/DL (ref 8.7–10.5)
CHLORIDE SERPL-SCNC: 107 MMOL/L (ref 95–110)
CO2 SERPL-SCNC: 25 MMOL/L (ref 23–29)
CREAT SERPL-MCNC: 0.9 MG/DL (ref 0.5–1.4)
ERYTHROCYTE [DISTWIDTH] IN BLOOD BY AUTOMATED COUNT: 11.6 % (ref 11.5–14.5)
GFR SERPLBLD CREATININE-BSD FMLA CKD-EPI: >60 ML/MIN/1.73/M2
GLUCOSE SERPL-MCNC: 76 MG/DL (ref 70–110)
HCT VFR BLD AUTO: 38.1 % (ref 40–54)
HGB BLD-MCNC: 12.2 GM/DL (ref 14–18)
IMM GRANULOCYTES # BLD AUTO: 0.01 K/UL (ref 0–0.04)
MCH RBC QN AUTO: 31.3 PG (ref 27–31)
MCHC RBC AUTO-ENTMCNC: 32 G/DL (ref 32–36)
MCV RBC AUTO: 98 FL (ref 82–98)
PLATELET # BLD AUTO: 166 K/UL (ref 150–450)
PMV BLD AUTO: 12.1 FL (ref 9.2–12.9)
POTASSIUM SERPL-SCNC: 4.7 MMOL/L (ref 3.5–5.1)
PROT SERPL-MCNC: 7.5 GM/DL (ref 6–8.4)
PSA SERPL-MCNC: 0.02 NG/ML
RBC # BLD AUTO: 3.9 M/UL (ref 4.6–6.2)
SODIUM SERPL-SCNC: 137 MMOL/L (ref 136–145)
TESTOST SERPL-MCNC: 20 NG/DL (ref 304–1227)
WBC # BLD AUTO: 5.61 K/UL (ref 3.9–12.7)

## 2025-03-31 PROCEDURE — G2211 COMPLEX E/M VISIT ADD ON: HCPCS | Mod: S$GLB,,, | Performed by: NURSE PRACTITIONER

## 2025-03-31 PROCEDURE — 96402 CHEMO HORMON ANTINEOPL SQ/IM: CPT

## 2025-03-31 PROCEDURE — 3078F DIAST BP <80 MM HG: CPT | Mod: CPTII,S$GLB,, | Performed by: NURSE PRACTITIONER

## 2025-03-31 PROCEDURE — 1159F MED LIST DOCD IN RCRD: CPT | Mod: CPTII,S$GLB,, | Performed by: NURSE PRACTITIONER

## 2025-03-31 PROCEDURE — 1126F AMNT PAIN NOTED NONE PRSNT: CPT | Mod: CPTII,S$GLB,, | Performed by: NURSE PRACTITIONER

## 2025-03-31 PROCEDURE — 99999 PR PBB SHADOW E&M-EST. PATIENT-LVL III: CPT | Mod: PBBFAC,,, | Performed by: NURSE PRACTITIONER

## 2025-03-31 PROCEDURE — 84460 ALANINE AMINO (ALT) (SGPT): CPT

## 2025-03-31 PROCEDURE — 84153 ASSAY OF PSA TOTAL: CPT

## 2025-03-31 PROCEDURE — 85027 COMPLETE CBC AUTOMATED: CPT

## 2025-03-31 PROCEDURE — 36415 COLL VENOUS BLD VENIPUNCTURE: CPT

## 2025-03-31 PROCEDURE — 3288F FALL RISK ASSESSMENT DOCD: CPT | Mod: CPTII,S$GLB,, | Performed by: NURSE PRACTITIONER

## 2025-03-31 PROCEDURE — 63600175 PHARM REV CODE 636 W HCPCS: Mod: JZ,TB | Performed by: NURSE PRACTITIONER

## 2025-03-31 PROCEDURE — 1101F PT FALLS ASSESS-DOCD LE1/YR: CPT | Mod: CPTII,S$GLB,, | Performed by: NURSE PRACTITIONER

## 2025-03-31 PROCEDURE — 3077F SYST BP >= 140 MM HG: CPT | Mod: CPTII,S$GLB,, | Performed by: NURSE PRACTITIONER

## 2025-03-31 PROCEDURE — 84403 ASSAY OF TOTAL TESTOSTERONE: CPT

## 2025-03-31 PROCEDURE — 3008F BODY MASS INDEX DOCD: CPT | Mod: CPTII,S$GLB,, | Performed by: NURSE PRACTITIONER

## 2025-03-31 PROCEDURE — 99215 OFFICE O/P EST HI 40 MIN: CPT | Mod: S$GLB,,, | Performed by: NURSE PRACTITIONER

## 2025-03-31 RX ORDER — TAMSULOSIN HYDROCHLORIDE 0.4 MG/1
0.4 CAPSULE ORAL 2 TIMES DAILY
Qty: 180 CAPSULE | Refills: 5 | Status: SHIPPED | OUTPATIENT
Start: 2025-03-31 | End: 2025-03-31

## 2025-03-31 RX ORDER — TAMSULOSIN HYDROCHLORIDE 0.4 MG/1
0.4 CAPSULE ORAL 2 TIMES DAILY
Qty: 180 CAPSULE | Refills: 5 | Status: SHIPPED | OUTPATIENT
Start: 2025-03-31

## 2025-03-31 RX ADMIN — LEUPROLIDE ACETATE 22.5 MG: KIT at 02:03

## 2025-03-31 NOTE — PROGRESS NOTES
[facility]: New patient visit  Best Contact Phone Number(s): [phone] (home)       Cancer/Stage/TNM:    Cancer Staging   No matching staging information was found for the patient.        Reason for visit:  Elevated PSA with history of prostate cancer    Molecular:  Germline Testin24 - Negative  Somatic Testing: N/A    Densitometry:  DEXA 24: Normal    Treatment History:   24 -     ADT     HPI:   Mode Gabriel is a 69 y.o. male with metachronous recurrence of prostate cancer. Initially diagnosed  at [facility], underwent short course course ADT with definitive EBRT. Ultimately lost to follow up. Represented to PCP 10/2024 with PSA elevated to 5.98. Increased to 10.2 on 24. PSMA PET CT 2024 without metastatic disease. He started ADT 2024. He presents to medical oncology clinic for initial evaluation.    Interval Events:  Presents to clinic alone for follow up of prostate cancer.  Currently on single agent ADT, leuprolide every 3 months. PSA responding.  Scheduled for biopsy of prostate in January however unable to perform due to pain.  Decision made to treat without biopsy.  Has ongoing urinary urgency but no incontinence. Nocturia persistent at 3x per night. Taking tamsulosin daily.  Will increase tamsulosin dose to BID.  Energy is good. No signficant hot flashes. No CP, SOB or cough. Appetite is good. Reports some constipation - recommend OTC remedies. Appettie is good and weight is up.     Lab Results   Component Value Date    PSADIAG 2.2 2025    PSADIAG 10.0 (H) 2024    PSADIAG 0.29 2021    PSA 0.02 2025         History has been obtained by chart review and discussion with the patient.     Oncology History   Prostate cancer    Initial Diagnosis    : Initial diagnosis and treatment at [facility]. Reportedly Elvis 7. Received hormone therapy and short course ADT.      2019 Tumor Markers    2019 -- PSA 0.3  2020 --  PSA 0.3  3/5/2021 -- PSA 0.29     10/2024 Tumor Markers    10/07/2024: PSA 5.98  11/01/2024: PSA 10.2     12/20/2024 Imaging Significant Findings    12/20/24 PSMA PET   Impression:  - In this patient with remote history of prostate cancer, there is mild heterogeneous tracer uptake within the prostate.  Attention to follow with rising PSA. No tracer avid lesions elsewhere to suggest metastatic disease.  - Patchy ground-glass opacities throughout the lungs bilaterally, suggestive of infectious/inflammatory etiology.           Past Medical History:   Diagnosis Date    Prostate cancer          Past Surgical History:   Procedure Laterality Date    BACK SURGERY N/A 2000    [facility]         Review of patient's allergies indicates:  No Known Allergies      Current Outpatient Medications   Medication Sig Dispense Refill    cholecalciferol, vitamin D3, (VITAMIN D3) 25 mcg (1,000 unit) capsule Take 1 capsule (1,000 Units total) by mouth once daily. 30 capsule 11    ciprofloxacin HCl (CIPRO) 500 MG tablet 1 pill one hour before prostate biopsy (Patient not taking: Reported on 3/31/2025) 1 tablet 0    diclofenac (VOLTAREN) 50 MG EC tablet Take 50 mg by mouth. (Patient not taking: Reported on 3/31/2025)      sildenafiL (VIAGRA) 100 MG tablet Take 1 tablet (100 mg total) by mouth daily as needed for Erectile Dysfunction. 10 tablet 12    tamsulosin (FLOMAX) 0.4 mg Cap Take 1 capsule (0.4 mg total) by mouth 2 (two) times a day. NEEDS OFFICE VISIT FOR FUTURE REFILLS 180 capsule 5     Current Facility-Administered Medications   Medication Dose Route Frequency Provider Last Rate Last Admin    LIDOcaine HCL 10 mg/ml (1%) injection 20 mL  20 mL Other 1 time in Clinic/HOD Duane Barrios MD         Review of Systems   Constitutional:  Negative for chills, fever, malaise/fatigue and weight loss.   Respiratory:  Negative for cough and shortness of breath.    Cardiovascular:  Negative for chest pain, palpitations and leg swelling.  "  Gastrointestinal:  Negative for abdominal pain, constipation and diarrhea.   Genitourinary:  Positive for frequency. Negative for dysuria, flank pain, hematuria and urgency.   Musculoskeletal:  Negative for back pain, joint pain and neck pain.   Neurological:  Positive for headaches. Negative for dizziness and weakness.          Objective:      Physical Exam:   BP (!) 145/76 (BP Location: Left arm, Patient Position: Sitting)   Pulse 61   Temp 98 °F (36.7 °C) (Oral)   Resp 18   Ht 5' 7" (1.702 m)   Wt 79.8 kg (175 lb 14.8 oz)   SpO2 97%   BMI 27.55 kg/m²       ECOG Performance status: (0) Fully active, able to carry on all predisease performance without restriction     Physical Exam  Constitutional:       General: He is not in acute distress.     Appearance: Normal appearance.   HENT:      Head: Normocephalic.   Eyes:      General: No scleral icterus.     Extraocular Movements: Extraocular movements intact.      Conjunctiva/sclera: Conjunctivae normal.   Cardiovascular:      Rate and Rhythm: Normal rate.   Pulmonary:      Effort: Pulmonary effort is normal. No respiratory distress.   Abdominal:      General: There is no distension.      Palpations: Abdomen is soft.   Skin:     General: Skin is warm and dry.   Neurological:      Mental Status: He is alert and oriented to person, place, and time.      Motor: No weakness.   Psychiatric:         Mood and Affect: Mood normal.         Behavior: Behavior normal.         Thought Content: Thought content normal.          Recent Labs:   Lab Results   Component Value Date    WBC 5.61 03/31/2025    RBC 3.90 (L) 03/31/2025    HGB 12.2 (L) 03/31/2025    HCT 38.1 (L) 03/31/2025     03/31/2025     03/31/2025    K 4.7 03/31/2025     03/31/2025    CO2 25 03/31/2025    GLU 81 01/06/2025    BUN 18 03/31/2025    CREATININE 0.9 03/31/2025    CALCIUM 9.5 03/31/2025    BILITOT 0.4 03/31/2025    AST 29 03/31/2025    ALT 21 03/31/2025          Lab Results " "  Component Value Date    PSADIAG 2.2 01/06/2025    PSADIAG 10.0 (H) 12/11/2024    PSADIAG 0.29 03/05/2021    PSA 0.02 03/31/2025        Cardiovascular Screening:  Primary care physician: Evens Martins MD      The ASCVD Risk score (Mariposa DK, et al., 2019) failed to calculate for the following reasons:    Cannot find a previous HDL lab    Cannot find a previous total cholesterol lab    EKG: No results found for this or any previous visit.    Body mass index is 27.55 kg/m².    No results found for: "LABA1C", "CHOL", "LDLCALC", "HDL", "TRIG", "HGBA1C"       Bone Health    Lab Results   Component Value Date    [id number] 24 (L) 12/11/2024        No results found for this or any previous visit.         PSMA PET IMAGING+     No results found for this or any previous visit.       I have personally reviewed the above imaging.     Path:   Reviewed pathology as documented above.      Diagnoses:     1. Prostate cancer    2. Androgen deprivation therapy    3. Long term current use of therapeutic drug    4. Urinary frequency    5. BPH with urinary obstruction        Assessment and Plan:     1. Prostate cancer  Overview:  Metachronous recurrence of prostate cancer. Initially diagnosed 2012 at [facility], underwent short course course ADT with definitive EBRT. Ultimately lost to follow up. Represented to PCP 10/2024 with pSA elevated to 5.98. PSMA PET CT 12/2024 without metastatic disease. He started ADT 01/2024.    Assessment & Plan:  No evidence of metastatic disease. May be a candidate for salvage cryoablation. Saw Dr. Barrios was unable to perform biopsy due to pain.   Started ADT with Lupron. If able to proceed with cryo, would limit finite single agent ADT to 6 months. Otherwise conider ADT + enzalutamide per EMBARK.  - Con't ADT   - Lupron today  - Con't vitamin D  - FU in 3 months      2. Androgen deprivation therapy  Assessment & Plan:  See prostate ca      3. Long term current use of therapeutic drug  Assessment & " Plan:  See prostate ca      4. Urinary frequency  -     Discontinue: tamsulosin (FLOMAX) 0.4 mg Cap; Take 1 capsule (0.4 mg total) by mouth 2 (two) times a day.  Dispense: 180 capsule; Refill: 5  -     tamsulosin (FLOMAX) 0.4 mg Cap; Take 1 capsule (0.4 mg total) by mouth 2 (two) times a day. NEEDS OFFICE VISIT FOR FUTURE REFILLS  Dispense: 180 capsule; Refill: 5    5. BPH with urinary obstruction  Assessment & Plan:  - increase tamsulosin dose 0.4 BID    Orders:  -     Discontinue: tamsulosin (FLOMAX) 0.4 mg Cap; Take 1 capsule (0.4 mg total) by mouth 2 (two) times a day.  Dispense: 180 capsule; Refill: 5  -     tamsulosin (FLOMAX) 0.4 mg Cap; Take 1 capsule (0.4 mg total) by mouth 2 (two) times a day. NEEDS OFFICE VISIT FOR FUTURE REFILLS  Dispense: 180 capsule; Refill: 5    Other orders  -     Cancel: leuprolide (LUPRON) injection 22.5 mg           Melissa Voltz, APRN [facility]  [address] [place] [id number]  Phone: (o) [phone]            Follow up:   Route Chart for Scheduling    Med Onc Chart Routing      Follow up with physician 3 months. rtc with labs (cbc, cmp, psa), leuprolide, and to see Dr. Maravilla   Follow up with MICHAEL    Infusion scheduling note    Injection scheduling note leuprolide in 3 mths   Labs CBC, CMP and PSA   Scheduling:  Preferred lab:  Lab interval:  cbc, cmp, psa   Imaging    Pharmacy appointment    Other referrals                     Supportive Plan Information  OP PROSTATE LEUPROLIDE Q3MO Eliseo Maravilla MD   Associated Diagnosis: Prostate cancer   noted on 12/11/2024   Line of treatment: First Line   Treatment goal: Palliative     Upcoming Treatment Dates - OP PROSTATE LEUPROLIDE Q3MO    6/23/2025       Chemotherapy       leuprolide (LUPRON) injection 22.5 mg  9/15/2025       Chemotherapy       leuprolide (LUPRON) injection 22.5 mg  12/8/2025       Chemotherapy       leuprolide (LUPRON) injection 22.5 mg  3/2/2026       Chemotherapy        leuprolide (LUPRON) injection 22.5 mg         The above information has been reviewed with the patient and all questions have been answered to their apparent satisfaction.  They understand that they can call the clinic with any questions.       code applied: patient requires or will require a continuous, longitudinal, and active collaborative plan of care related to this patient's health condition, prostate cancer --the management of which requires the direction of a practitioner with specialized clinical knowledge, skill, and expertise.

## 2025-03-31 NOTE — PLAN OF CARE
1455 - Pt tolerated Lupron injection well today, no complaints or complications. IM injection given in right upper quad gluteus without issue. Pt aware to call provider with any questions or concerns and is aware of upcoming appts. Pt ambulatory from clinic with steady gait, no distress noted.

## 2025-03-31 NOTE — ASSESSMENT & PLAN NOTE
No evidence of metastatic disease. May be a candidate for salvage cryoablation. Saw Dr. Barrios was unable to perform biopsy due to pain.   Started ADT with Lupron. If able to proceed with cryo, would limit finite single agent ADT to 6 months. Otherwise conider ADT + enzalutamide per EMBARK.  - Con't ADT   - Lupron today  - Con't vitamin D  - FU in 3 months

## 2025-04-09 ENCOUNTER — TELEPHONE (OUTPATIENT)
Dept: HEMATOLOGY/ONCOLOGY | Facility: CLINIC | Age: 70
End: 2025-04-09
Payer: MEDICARE

## 2025-04-10 PROBLEM — Z79.818 LONG TERM (CURRENT) USE OF OTHER AGENTS AFFECTING ESTROGEN RECEPTORS AND ESTROGEN LEVELS: Status: ACTIVE | Noted: 2025-03-31

## 2025-04-16 ENCOUNTER — TELEPHONE (OUTPATIENT)
Dept: HEMATOLOGY/ONCOLOGY | Facility: CLINIC | Age: 70
End: 2025-04-16
Payer: MEDICARE

## 2025-04-22 NOTE — PROGRESS NOTES
"Cancer Genetics  Hereditary and High-Risk Clinic  Department of Hematology and Oncology  [facility]    Date of Service:  25  Visit Provider:  Preston Woods DNP  Collaborating Physician:  Shirley Guy MD    Patient ID  Name: Mode Gabriel    : 1955    MRN: [id number]      Visit Information  Face to Face time with patient: 14 minutes  22 minutes of total time spent on the encounter, which includes face to face time and non-face to face time preparing to see the patient (eg, review of tests), Obtaining and/or reviewing separately obtained history, Documenting clinical information in the electronic or other health record, Independently interpreting results (not separately reported) and communicating results to the patient/family/caregiver, or Care coordination (not separately reported).     SUBJECTIVE      Chief Complaint: Results    History of Present Illness (HPI):  Mode Gabriel ("Mode"), 69 y.o., assigned male sex at birth, initially presented on 2024 for cancer-genetic risk assessment (upon referral by Dr. Eliseo Maravilla) and subsequently underwent hereditary cancer genetic testing.  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  He returns today for post-test genetic counseling.    Mode denies pertinent interval changes to his personal/family history beyond those noted herein.    Focused Medical History  Cancer:  Prostate cancer, dx.age 56; underwent short-course ADT with definitive EBRT; re-presented 10/2024 with elevated PSA  Colon polyp:  "I think I did" have colon polyps ("small") - Undergoes colonoscopies at [facility], most recently last year(?)  Other tumor or pertinent mass/lesion:  No  Pancreatitis:  No  Blood disorder:  No   "   Focused Social History  Former smoker     ONCOLOGY PEDIGREE  Ancestry:  Ashkenazi Bahai:  No  Consanguinity:  No  Hereditary cancer genetic testing in blood relatives:  No  Other than noted, no known family history of cancer.  Other than noted, no known family history of colon polyps.     ** If this pedigree appears small/illegible on your screen, expand this note window horizontally. **      Review of Systems  See HPI.    Patient fell onto his left shoulder/upper left arm in Jul/Aug 2024 and had a little pain shortly thereafter, but it resolved.  Two weeks ago, he started with constant pain there again, without any known re-injury.  He denies having experienced chest pain or shortness of breath.  He denies known history of cardiac problems.     OBJECTIVE     Physical Exam  Very pleasant patient.  Accompanied by his daughter, Thi, who is also very pleasant.  Vitals signs:  Reviewed:  Vitals:    04/23/25 1005 04/23/25 1031   BP:  116/70   Pulse:  63   SpO2:  98%   PainSc:   8      (Pain is rated on scale of 0-10 on which 10=worst.)  Distress score:  Reviewed: (0/10, on scale of 0-10 on which 10=worst).  Constitutional      Appearance:  Appears well developed and well nourished. No distress.   Cardiac     Heart sounds:  S1 and S2 auscultated.  Pulmonary     Effort:  Normal.     Breath sounds:  No abnormality auscultated.  Neurological     Mental Status:  Alert and oriented.     Coordination:  Normal.   Psychiatric         Mood and Affect:  Normal.     Thought Content:  Normal.     Speech:  Normal.     Behavior:  Normal.     Judgment:  Normal.    Gene Variant Review    Variant Source Genetic Variant Germline Classification per ClinVar Reference   Mode's germline testing through [facility] (0742-0816) NF2 c.263A>G Uncertain significance     National Center for Biotechnology Information. ClinVar; [BZM577320123.13], https://www.ncbi.nlm.nih.gov/clinvar/variation/GFU363163123.13 (accessed April 24, 2025).    PMS2  "c.566A>G Uncertain significance     National Center for Biotechnology Information. ClinVar; [[id number]], https://www.ncbi.nlm.nih.gov/clinvar/variation/JXI270643326.14 (accessed April 24, 2025).      ASSESSMENT / PLAN      Mode Gabriel ("Mode"), 69 y.o., assigned male sex at birth, initially presented on 12/19/2024 for cancer-genetic risk assessment (upon referral by Dr. Eliseo Maravilla) and subsequently underwent hereditary cancer genetic testing.  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  He returns today for post-test genetic counseling.    Germline (Hereditary) Cancer Susceptibility Gene Testing  This is a partial copy of the report.  The complete report can be found in the patient's medical record.              No known clinically significant mutations were identified; however, your genetic testing did show that you carry a variant of uncertain significance (VUS) in multiple genes.  A VUS is a genetic change that may be benign or may be harmful, but at this time there is not enough data to categorize it as either.  VUSs are not typically considered to be clinically actionable.  Most VUSs that are reclassified are downgraded to benign or likely benign.  If [facility] reclassifies a VUS of yours in the future, notification should be provided; additionally, I encourage you to call [employer] every 6-12 months at phone number [phone] to check the classification statuses of your VUSs at that time, and please let me know if any updates are ever provided when you do call [employer].    While you are not known at this time to carry any pathogenic (harmful) variants (also known as mutations) in NF2 or PMS2, the below is provided for your reference:  Pathogenic variants (mutations) in the NF2 gene can be " associated with:  Autosomal-dominantly inherited predisposition* to:  Neurofibromatosis type 2, a disorder characterized by the growth of noncancerous (benign) tumors in the nervous system; the most common tumors associated with this condition are bilateral vestibular schwannomas, which develop along the nerves that carry information from the inner ear to the brain (the auditory nerves); these tumors arise from Schwann cells; affected individuals can also develop other nervous system tumors, including meningiomas and ependymomas  Pathogenic variants (mutations) in the PMS2 gene can be associated with:  Autosomal-dominantly inherited predisposition* to:  PMS2 Edgar syndrome, a hereditary cancer syndrome associated with increased risk of developing cancers including but not limited to colorectal and endometrial cancers  Autosomal-recessively inherited risk* for:  Constitutional mismatch repair deficiency (CMMRD) syndrome, a rare disorder that greatly increases the risk of developing one or more types of cancer in children and young adults; the cancers that most commonly occur in CMMRD syndrome are colorectal cancer, brain cancer, leukemia, and lymphoma   * Terms and definitions:  Autosomal-dominantly inherited predisposition:  Carrying only one mutated copy of the gene (not two mutated copies) is sufficient for the predisposition to apply.  Autosomal-recessively inherited risk:  Carrying only one mutated copy of the gene is not sufficient for the condition to develop; rather, both copies of the gene must be mutated.    With regard to your own diagnosis of prostate cancer, the negative (normal) results in the associated genes represent a true-negative (or an informative) result, meaning that your prostate cancer was more likely to have been sporadic than hereditary; however, these genetic testing results reduce--but do not eliminate--the possibility of previously diagnosed cancers of yours having been hereditary or the  possibility of your developing a hereditary cancer in the future.    Health Maintenance / Cancer Risks and Risk Management    Only a small percentage (approximately 5%-10%) of cancers are hereditary, meaning caused by an inherited gene mutation; rather, most cancers are sporadic.  Environmental factors, lifestyle factors, and even factors beyond our control play a significant role in the development of many cancers.  Even if an individual has negative genetic testing, they can still be at increased risk for certain cancers, as they may independently have risk factors for those cancers and/or may share risk factors with family members affected by cancer.  It is strongly recommended that you ensure that your healthcare providers are aware of and up-to-date as to your personal and family history so that your cancer screenings can be based in part off of this information.      With regard to cancer risk reduction in general, it is recommended to avoid tobacco use; be physically active; maintain a healthy weight; eat a diet rich in fruits, vegetables, and whole grains and low in saturated/trans fat, red meat, and processed meat; limit alcohol consumption (zero is best); protect against sexually transmitted infections; protect against the sun, and avoid tanning beds; and get regular screenings for cancers as recommended by your healthcare team.    The below is general guidance regarding cancer screenings and is not to be considered exhaustive.  If you believe you need to see any of the below specialists, notify your cancer-genetics specialist promptly.     Cancer in general:  Attend an annual visit with a primary care provider (PCP) for history review, physical exam, and age-appropriate testing.  Additionally, beyond those cancer screenings listed herein, undergo screenings/surveillance as recommended by your healthcare providers.      Skin cancer:  Undergo total-body skin examination (TBSE) with a dermatology provider at  least annually.    Colorectal cancer:  Continue colorectal cancer screening as recommended by your gastroenterology (GI) provider.  With regard to family history, advise your GI provider or cancer-genetics specialist if any of the following applies, as such could impact colorectal cancer screening recommendations for you:  (a) Any blood relative of yours has been diagnosed with colorectal cancer; (b) a 1st-degree relative of yours has a colorectal polyp history including any of the following characteristics:  adenoma with high-grade dysplasia, adenoma or sessile serrated polyp/adenoma 1 cm or larger in size, villous or tubulovillous adenoma, traditional serrated adenoma (TSA), sessile serrated lesion/polyp/adenoma with any dysplasia, cumulatively 10 or more polyps.    Lung cancer:  If you have a 20-pack-year smoking history or greater and are at least 50 years of age, shared patient/provider decision-making regarding low-dose CT scan (LDCT) for lung cancer screening is recommended; if you have at least a 20-pack-year smoking history or are unsure, discuss with your healthcare provider whether lung cancer screening is right for you.    Regarding Your Relatives    Your relatives also may be at increased risk for certain cancers, depending upon their own personal and family history; therefore, it is important that they, too, ensure that their own healthcare providers are aware of and up-to-date as to their personal and family history so that their medical management, including cancer screenings, can be based in part off of this information.      Additionally, it is possible for your relatives to have hereditary cancer susceptibility gene mutations in addition to and/or other than those identified in you (or if/when you have negative genetic testing).  Of note, you could not have passed onto your children a cancer susceptibility gene mutation that you do not carry; however, it is still possible for them to have  inherited such a mutation from their other biological parent and develop mutations themselves.    Your relatives should speak with a healthcare provider about their cancer risks and whether genetic counseling and potentially testing may be indicated for them.  Anyone interested in pursuing cancer genetic counseling/testing may contact the [facility] at [phone] to schedule an appointment or may visit [email or web address] to locate a cancer-genetic specialist near them.    Follow-up    Please continue to follow up with all healthcare providers as directed.    Moving forward, please update me with any changes to--or new information learned about--your personal or family history and with any relative's genetic testing results.  Barring any such changes, please follow up with me in 3 years for determination as to whether updated genetic testing is indicated for you at that time.  In the meantime, please don't hesitate to reach out with any questions or concerns, or follow up anytime sooner than planned as you wish.            Diagnoses and Orders for This Encounter:    ICD-10-CM ICD-9-CM   1. Encounter for nonprocreative genetic counseling  Z71.83 V26.33   2. Prostate cancer  C61 185   3. History of colonoscopy  Z98.890 V45.89   4. General medical exam  Z00.00 V70.9   5. Pain of left upper extremity  M79.602 729.5     1. Encounter for nonprocreative genetic counseling  - Recent germline multiple-cancer genetic panel with no known clinically significant variants reported but with two variants of uncertain significance reported, as above  - Post-test genetic counseling conducted  - Paper copy of results report provided to patient by Nikki with my office    2. Prostate cancer  - Mode is under the care of Dr. Eliseo Maravilla    3. History of colonoscopy  - MIRTA form completion and send-off facilitated by Nikki today for patient's colonoscopy records from [facility] from birth to present    4. General  medical exam  - Ambulatory referral/consult to Dermatology; Future (total-body skin exams for skin cancer screening)    5. Pain of left upper extremity  - Patient fell onto his left shoulder/upper left arm in Jul/Aug 2024 and had a little pain shortly thereafter, but it resolved.  Two weeks ago, he started with constant pain there again, without any known re-injury.  He denies having experienced chest pain or shortness of breath.  He denies known history of cardiac problems. -- I have low clinical suspicion that this is cardiac is nature but also feel it is unlikely due to fall given time that has elapsed -- Notified Coretta Samano NP, whom patient has seen in Primary Care Oncology clinic, and she said that she or Dr. Myers can see him for follow-up or Ortho referral can be placed  - Asked Dr. Myers's office to schedule patient    Follow-up:  Follow up in about 3 years (around 4/23/2028).  Follow up sooner as needed.    Questions were encouraged and answered to the patient's satisfaction, and he verbalized understanding of the information and agreement with the plan.  Advised Mode that pertinent information will be accessible in this visit note for his review.              Preston Woods, DNP, APRN, FNP-BC, AOCNP, CGRA  Nurse Practitioner, Hereditary and High-Risk Clinic  Department of Hematology and Oncology  [facility]        CC:  Eliseo Maravilla MD        Routed to Cancer Genetics Staff:  - Please place recall for 04/23/2028.

## 2025-04-23 ENCOUNTER — OFFICE VISIT (OUTPATIENT)
Dept: HEMATOLOGY/ONCOLOGY | Facility: CLINIC | Age: 70
End: 2025-04-23
Payer: MEDICARE

## 2025-04-23 VITALS — OXYGEN SATURATION: 98 % | SYSTOLIC BLOOD PRESSURE: 116 MMHG | DIASTOLIC BLOOD PRESSURE: 70 MMHG | HEART RATE: 63 BPM

## 2025-04-23 DIAGNOSIS — Z00.00 GENERAL MEDICAL EXAM: ICD-10-CM

## 2025-04-23 DIAGNOSIS — Z98.890 HISTORY OF COLONOSCOPY: ICD-10-CM

## 2025-04-23 DIAGNOSIS — C61 PROSTATE CANCER: ICD-10-CM

## 2025-04-23 DIAGNOSIS — Z71.83 ENCOUNTER FOR NONPROCREATIVE GENETIC COUNSELING: Primary | ICD-10-CM

## 2025-04-23 DIAGNOSIS — M79.602 PAIN OF LEFT UPPER EXTREMITY: ICD-10-CM

## 2025-04-23 PROCEDURE — 3288F FALL RISK ASSESSMENT DOCD: CPT | Mod: CPTII,S$GLB,, | Performed by: NURSE PRACTITIONER

## 2025-04-23 PROCEDURE — 1125F AMNT PAIN NOTED PAIN PRSNT: CPT | Mod: CPTII,S$GLB,, | Performed by: NURSE PRACTITIONER

## 2025-04-23 PROCEDURE — 3078F DIAST BP <80 MM HG: CPT | Mod: CPTII,S$GLB,, | Performed by: NURSE PRACTITIONER

## 2025-04-23 PROCEDURE — 99999 PR PBB SHADOW E&M-EST. PATIENT-LVL III: CPT | Mod: PBBFAC,,, | Performed by: NURSE PRACTITIONER

## 2025-04-23 PROCEDURE — 3074F SYST BP LT 130 MM HG: CPT | Mod: CPTII,S$GLB,, | Performed by: NURSE PRACTITIONER

## 2025-04-23 PROCEDURE — 1101F PT FALLS ASSESS-DOCD LE1/YR: CPT | Mod: CPTII,S$GLB,, | Performed by: NURSE PRACTITIONER

## 2025-04-23 PROCEDURE — 99213 OFFICE O/P EST LOW 20 MIN: CPT | Mod: S$GLB,,, | Performed by: NURSE PRACTITIONER

## 2025-04-23 NOTE — Clinical Note
Please contact pt to schedule visit with Dr. Myers for left upper extremity pain.  Thanks!  Preston Woods NP Cancer Genetics

## 2025-04-23 NOTE — Clinical Note
Dr. Maravilla - [facility]:  - Patient fell onto his left shoulder/upper left arm in Jul/Aug 2024 and had a little pain shortly thereafter, but it resolved.  Two weeks ago, he started with constant pain there again, without any known re-injury.  He denies having experienced chest pain or shortness of breath.  He denies known history of cardiac problems. - I have low clinical suspicion that this is cardiac is nature but also feel it is unlikely due to fall given time that has elapsed - Notified Coretta Samano NP, whom patient has seen in Primary Care Oncology clinic, and she said that she or Dr. Myers can see him for follow-up or Ortho referral can be placed - Asked Dr. Myers's office to schedule patient

## 2025-04-24 ENCOUNTER — PATIENT MESSAGE (OUTPATIENT)
Dept: HEMATOLOGY/ONCOLOGY | Facility: CLINIC | Age: 70
End: 2025-04-24
Payer: MEDICARE

## 2025-04-25 ENCOUNTER — TELEPHONE (OUTPATIENT)
Dept: HEMATOLOGY/ONCOLOGY | Facility: CLINIC | Age: 70
End: 2025-04-25
Payer: MEDICARE

## 2025-04-29 ENCOUNTER — TELEPHONE (OUTPATIENT)
Dept: DERMATOLOGY | Facility: CLINIC | Age: 70
End: 2025-04-29
Payer: MEDICARE

## 2025-04-29 NOTE — TELEPHONE ENCOUNTER
I spoke with the pt's daughter and got him scheduled with Barrera Chance on 05/01/2025 at 9:30 AM.  She thanked me for the call.

## 2025-04-30 ENCOUNTER — TELEPHONE (OUTPATIENT)
Dept: DERMATOLOGY | Facility: CLINIC | Age: 70
End: 2025-04-30
Payer: MEDICARE

## 2025-05-19 NOTE — PROGRESS NOTES
Subjective     Patient ID: Mode Gabriel is a 69 y.o. male.    Chief Complaint: L arm pain    Pt is a 69 y.o. male with metachronous recurrence of prostate cancer, who was referred to me for evaluation of L arm pain. Patient fell onto his left shoulder/upper left arm in Jul/Aug 2024 and had a little pain shortly thereafter, but it resolved.  Four weeks ago, he started with constant pain there again, without any known re-injury. Now, the pain has resolved. The pain felt like dull pain, never had to take medicine. Does not have any issues w/ range of motion. Denies chest pain, chest pressure, shooting pain down the arm, SOB, changes in his activity level/ exertion.         Review of Systems   Constitutional:  Negative for activity change, appetite change, fever, night sweats and unexpected weight change.   Respiratory:  Negative for chest tightness and shortness of breath.    Cardiovascular:  Negative for chest pain, palpitations and leg swelling.   Gastrointestinal:  Negative for abdominal pain.   Genitourinary:  Positive for difficulty urinating and urgency. Negative for bladder incontinence and flank pain.          Objective     Physical Exam  Constitutional:       Appearance: Normal appearance.   HENT:      Head: Normocephalic and atraumatic.   Cardiovascular:      Rate and Rhythm: Normal rate and regular rhythm.   Pulmonary:      Effort: Pulmonary effort is normal.      Breath sounds: Normal breath sounds.   Abdominal:      General: Abdomen is flat. Bowel sounds are normal.      Palpations: Abdomen is soft.   Musculoskeletal:         General: No swelling.      Left shoulder: Normal. No tenderness, bony tenderness or crepitus. Normal range of motion.      Left upper arm: Normal. No swelling, edema, deformity or tenderness.      Right lower leg: No edema.      Left lower leg: No edema.   Skin:     General: Skin is warm and dry.   Neurological:      Mental Status: He is alert and oriented to person, place, and  time.   Psychiatric:         Mood and Affect: Mood normal.         Behavior: Behavior normal.         Thought Content: Thought content normal.        Assessment and Plan     1. Left arm pain  Assessment & Plan:  Patient fell onto his left shoulder/upper left arm in Jul/Aug 2024 and had a little pain shortly thereafter, but it resolved.  Four weeks ago, he started with constant pain there again, without any known re-injury, however pain has completely resolved. No abnormality noted on physical exam. Very low suspicion for cardiac cause given no chest pain, pressure, no SOB noted w/ exertion.   -CTM, pain has completely resolved  -Will not pursue imaging at this time      2. Primary hypertension  Assessment & Plan:  Noted to have mildly elevated blood pressure today to 137/70. In the past blood pressures have ranged in the 120s-mid 130s SBP.  -Defer medication at this time. Florentin MCDOWELL. Pt will work on diet and exercise. Daughter to measure BPs at home          Med Onc Chart Routing      Follow up with physician 4 months.   Follow up with MICHAEL    Infusion scheduling note    Injection scheduling note    Labs    Imaging    Pharmacy appointment    Other referrals                    No follow-ups on file.

## 2025-05-20 ENCOUNTER — OFFICE VISIT (OUTPATIENT)
Dept: HEMATOLOGY/ONCOLOGY | Facility: CLINIC | Age: 70
End: 2025-05-20
Payer: MEDICARE

## 2025-05-20 VITALS
OXYGEN SATURATION: 98 % | DIASTOLIC BLOOD PRESSURE: 70 MMHG | HEIGHT: 67 IN | HEART RATE: 60 BPM | TEMPERATURE: 98 F | BODY MASS INDEX: 28.2 KG/M2 | WEIGHT: 179.69 LBS | SYSTOLIC BLOOD PRESSURE: 137 MMHG

## 2025-05-20 DIAGNOSIS — I10 PRIMARY HYPERTENSION: ICD-10-CM

## 2025-05-20 DIAGNOSIS — M79.602 LEFT ARM PAIN: Primary | ICD-10-CM

## 2025-05-20 PROCEDURE — 1159F MED LIST DOCD IN RCRD: CPT | Mod: CPTII,S$GLB,, | Performed by: STUDENT IN AN ORGANIZED HEALTH CARE EDUCATION/TRAINING PROGRAM

## 2025-05-20 PROCEDURE — 1126F AMNT PAIN NOTED NONE PRSNT: CPT | Mod: CPTII,S$GLB,, | Performed by: STUDENT IN AN ORGANIZED HEALTH CARE EDUCATION/TRAINING PROGRAM

## 2025-05-20 PROCEDURE — 3078F DIAST BP <80 MM HG: CPT | Mod: CPTII,S$GLB,, | Performed by: STUDENT IN AN ORGANIZED HEALTH CARE EDUCATION/TRAINING PROGRAM

## 2025-05-20 PROCEDURE — 1101F PT FALLS ASSESS-DOCD LE1/YR: CPT | Mod: CPTII,S$GLB,, | Performed by: STUDENT IN AN ORGANIZED HEALTH CARE EDUCATION/TRAINING PROGRAM

## 2025-05-20 PROCEDURE — 99213 OFFICE O/P EST LOW 20 MIN: CPT | Mod: S$GLB,,, | Performed by: STUDENT IN AN ORGANIZED HEALTH CARE EDUCATION/TRAINING PROGRAM

## 2025-05-20 PROCEDURE — 99999 PR PBB SHADOW E&M-EST. PATIENT-LVL III: CPT | Mod: PBBFAC,,, | Performed by: STUDENT IN AN ORGANIZED HEALTH CARE EDUCATION/TRAINING PROGRAM

## 2025-05-20 PROCEDURE — 3008F BODY MASS INDEX DOCD: CPT | Mod: CPTII,S$GLB,, | Performed by: STUDENT IN AN ORGANIZED HEALTH CARE EDUCATION/TRAINING PROGRAM

## 2025-05-20 PROCEDURE — 3288F FALL RISK ASSESSMENT DOCD: CPT | Mod: CPTII,S$GLB,, | Performed by: STUDENT IN AN ORGANIZED HEALTH CARE EDUCATION/TRAINING PROGRAM

## 2025-05-20 PROCEDURE — 3075F SYST BP GE 130 - 139MM HG: CPT | Mod: CPTII,S$GLB,, | Performed by: STUDENT IN AN ORGANIZED HEALTH CARE EDUCATION/TRAINING PROGRAM

## 2025-05-20 NOTE — ASSESSMENT & PLAN NOTE
Noted to have mildly elevated blood pressure today to 137/70. In the past blood pressures have ranged in the 120s-mid 130s SBP.  -Defer medication at this time. Florentin MCDOWELL. Pt will work on diet and exercise. Daughter to measure BPs at home

## 2025-05-20 NOTE — ASSESSMENT & PLAN NOTE
Patient fell onto his left shoulder/upper left arm in Jul/Aug 2024 and had a little pain shortly thereafter, but it resolved.  Four weeks ago, he started with constant pain there again, without any known re-injury, however pain has completely resolved. No abnormality noted on physical exam. Very low suspicion for cardiac cause given no chest pain, pressure, no SOB noted w/ exertion.   -CTM, pain has completely resolved  -Will not pursue imaging at this time

## 2025-05-29 ENCOUNTER — DOCUMENTATION ONLY (OUTPATIENT)
Dept: HEMATOLOGY/ONCOLOGY | Facility: CLINIC | Age: 70
End: 2025-05-29
Payer: MEDICARE

## 2025-05-29 ENCOUNTER — PATIENT MESSAGE (OUTPATIENT)
Dept: HEMATOLOGY/ONCOLOGY | Facility: CLINIC | Age: 70
End: 2025-05-29
Payer: MEDICARE

## 2025-05-29 NOTE — PROGRESS NOTES
"Records Received    Patient ID  Name:  Mode Gabriel ("Mode")  :  1955  MRN:  [id number]     Assessment  Outside records have been received from [facility], as pertinent to my care of the Mode have been reviewed and summarized below, and are to be stored in the Media section of his [facility] record.    2020 (age 64) colonoscopy procedure report indicates 2-mm sessile polyp and 4-mm sessile polyp removed from cecum and descending colon, respectively, and endoscopist Dr. Joshua Cuevas recommended 5-year repeat screening colonoscopy at that time; corresponding pathology report indicates tubular adenomatous polyp of cecum and and tubular adenomatous polyp of descending colon    Plan   [x] Sent portal message to patient regarding the above.   [x] My office to scan into Media the above-referenced records if not already done.   [] Other:            "

## 2025-06-17 ENCOUNTER — TELEPHONE (OUTPATIENT)
Dept: DERMATOLOGY | Facility: CLINIC | Age: 70
End: 2025-06-17
Payer: MEDICARE

## 2025-06-18 ENCOUNTER — OFFICE VISIT (OUTPATIENT)
Dept: DERMATOLOGY | Facility: CLINIC | Age: 70
End: 2025-06-18
Payer: MEDICARE

## 2025-06-18 DIAGNOSIS — Z12.83 SCREENING EXAM FOR SKIN CANCER: ICD-10-CM

## 2025-06-18 DIAGNOSIS — L85.3 XEROSIS CUTIS: Primary | ICD-10-CM

## 2025-06-18 DIAGNOSIS — D23.9 DERMATOFIBROMA: ICD-10-CM

## 2025-06-18 PROCEDURE — 99203 OFFICE O/P NEW LOW 30 MIN: CPT | Mod: S$GLB,,, | Performed by: STUDENT IN AN ORGANIZED HEALTH CARE EDUCATION/TRAINING PROGRAM

## 2025-06-18 PROCEDURE — 1160F RVW MEDS BY RX/DR IN RCRD: CPT | Mod: CPTII,S$GLB,, | Performed by: STUDENT IN AN ORGANIZED HEALTH CARE EDUCATION/TRAINING PROGRAM

## 2025-06-18 PROCEDURE — 1159F MED LIST DOCD IN RCRD: CPT | Mod: CPTII,S$GLB,, | Performed by: STUDENT IN AN ORGANIZED HEALTH CARE EDUCATION/TRAINING PROGRAM

## 2025-06-18 PROCEDURE — 1101F PT FALLS ASSESS-DOCD LE1/YR: CPT | Mod: CPTII,S$GLB,, | Performed by: STUDENT IN AN ORGANIZED HEALTH CARE EDUCATION/TRAINING PROGRAM

## 2025-06-18 PROCEDURE — 3288F FALL RISK ASSESSMENT DOCD: CPT | Mod: CPTII,S$GLB,, | Performed by: STUDENT IN AN ORGANIZED HEALTH CARE EDUCATION/TRAINING PROGRAM

## 2025-06-18 PROCEDURE — 1126F AMNT PAIN NOTED NONE PRSNT: CPT | Mod: CPTII,S$GLB,, | Performed by: STUDENT IN AN ORGANIZED HEALTH CARE EDUCATION/TRAINING PROGRAM

## 2025-06-18 PROCEDURE — 99999 PR PBB SHADOW E&M-EST. PATIENT-LVL II: CPT | Mod: PBBFAC,,, | Performed by: STUDENT IN AN ORGANIZED HEALTH CARE EDUCATION/TRAINING PROGRAM

## 2025-06-18 NOTE — PROGRESS NOTES
Subjective:      Patient ID:  Mode Gabriel is a 69 y.o. male who presents for   Chief Complaint   Patient presents with    Dry Skin     Diffuse      Mode Gabriel is a 69 y.o. male who presents for: dry skin, TBSE     New patient    The patient has the following lesions of concern:  Location: dry   Duration: > 1 yr   Symptoms: dry   Relieving factors/Previous treatments: Vaseline   Not itchy dry skin of arms reported  Using Dial soap    Pertinent history:  History of blistering sunburns: No  History of tanning bed use: No  Family history of melanoma: No  Personal history of mole removal: No  Personal history of skin cancer: No    Hx prostate cancer- undergoing surveillance per patient          Review of Systems   Skin:  Positive for dry skin and wears hat. Negative for daily sunscreen use, activity-related sunscreen use and recent sunburn.   Hematologic/Lymphatic: Does not bruise/bleed easily.       Objective:   Physical Exam   Constitutional: He appears well-developed and well-nourished. No distress.   Neurological: He is alert and oriented to person, place, and time. He is not disoriented.   Psychiatric: He has a normal mood and affect.   Skin:   Areas Examined (abnormalities noted in diagram):   Scalp / Hair Palpated and Inspected  Head / Face Inspection Performed  Neck Inspection Performed  Chest / Axilla Inspection Performed  Abdomen Inspection Performed  Genitals / Buttocks / Groin Inspection Performed  Back Inspection Performed  RUE Inspected  LUE Inspection Performed  RLE Inspected  LLE Inspection Performed  Nails and Digits Inspection Performed                     Diagram Legend     Erythematous scaling macule/papule c/w actinic keratosis       Vascular papule c/w angioma      Pigmented verrucoid papule/plaque c/w seborrheic keratosis      Yellow umbilicated papule c/w sebaceous hyperplasia      Irregularly shaped tan macule c/w lentigo     1-2 mm smooth white papules consistent with Milia       Movable subcutaneous cyst with punctum c/w epidermal inclusion cyst      Subcutaneous movable cyst c/w pilar cyst      Firm pink to brown papule c/w dermatofibroma      Pedunculated fleshy papule(s) c/w skin tag(s)      Evenly pigmented macule c/w junctional nevus     Mildly variegated pigmented, slightly irregular-bordered macule c/w mildly atypical nevus      Flesh colored to evenly pigmented papule c/w intradermal nevus       Pink pearly papule/plaque c/w basal cell carcinoma      Erythematous hyperkeratotic cursted plaque c/w SCC      Surgical scar with no sign of skin cancer recurrence      Open and closed comedones      Inflammatory papules and pustules      Verrucoid papule consistent consistent with wart     Erythematous eczematous patches and plaques     Dystrophic onycholytic nail with subungual debris c/w onychomycosis     Umbilicated papule    Erythematous-base heme-crusted tan verrucoid plaque consistent with inflamed seborrheic keratosis     Erythematous Silvery Scaling Plaque c/w Psoriasis     See annotation      Assessment / Plan:        Xerosis cutis  Recommend bathing in lukewarm water (not scalding hot) no more than 5-10 minutes per day. Recommend fragrance-free detergent (I.e. All Free and Clear) and soap (I.e. Dove sensitive skin bar soap) only to soiled areas of body. Recommend liberal use of moisturizing cream (I.e. CeraVe cream, Cetaphil cream) especially within 5 minutes of bathing.    Idiopathic guttate hypomelanosis  Reassurance    Dermatofibroma  This is a benign scar-like lesion secondary to minor trauma. No treatment required.     Screening exam for skin cancer  Total body skin examination performed today including at least 12 points as noted in physical examination. No lesions suspicious for malignancy noted.    Recommend daily sun protection/avoidance, use of at least SPF 30, broad spectrum sunscreen (OTC drug), skin self examinations, and routine physician surveillance to optimize  early detection    No follow-ups on file.

## 2025-06-30 ENCOUNTER — INFUSION (OUTPATIENT)
Dept: INFUSION THERAPY | Facility: HOSPITAL | Age: 70
End: 2025-06-30
Payer: MEDICARE

## 2025-06-30 ENCOUNTER — LAB VISIT (OUTPATIENT)
Dept: LAB | Facility: HOSPITAL | Age: 70
End: 2025-06-30
Payer: MEDICARE

## 2025-06-30 ENCOUNTER — OFFICE VISIT (OUTPATIENT)
Dept: HEMATOLOGY/ONCOLOGY | Facility: CLINIC | Age: 70
End: 2025-06-30
Payer: MEDICARE

## 2025-06-30 VITALS
HEART RATE: 65 BPM | HEIGHT: 67 IN | SYSTOLIC BLOOD PRESSURE: 115 MMHG | TEMPERATURE: 97 F | BODY MASS INDEX: 28.2 KG/M2 | WEIGHT: 179.69 LBS | RESPIRATION RATE: 18 BRPM | DIASTOLIC BLOOD PRESSURE: 59 MMHG | OXYGEN SATURATION: 98 %

## 2025-06-30 DIAGNOSIS — R35.0 URINARY FREQUENCY: ICD-10-CM

## 2025-06-30 DIAGNOSIS — N13.8 BPH WITH URINARY OBSTRUCTION: ICD-10-CM

## 2025-06-30 DIAGNOSIS — C61 PROSTATE CANCER: Primary | ICD-10-CM

## 2025-06-30 DIAGNOSIS — C61 PROSTATE CANCER: ICD-10-CM

## 2025-06-30 DIAGNOSIS — N40.1 BPH WITH URINARY OBSTRUCTION: ICD-10-CM

## 2025-06-30 LAB
ABSOLUTE EOSINOPHIL (OHS): 0.26 K/UL
ABSOLUTE MONOCYTE (OHS): 0.49 K/UL (ref 0.3–1)
ABSOLUTE NEUTROPHIL COUNT (OHS): 1.21 K/UL (ref 1.8–7.7)
ALBUMIN SERPL BCP-MCNC: 3.8 G/DL (ref 3.5–5.2)
ALP SERPL-CCNC: 72 UNIT/L (ref 40–150)
ALT SERPL W/O P-5'-P-CCNC: 20 UNIT/L (ref 10–44)
ANION GAP (OHS): 7 MMOL/L (ref 8–16)
AST SERPL-CCNC: 30 UNIT/L (ref 11–45)
BASOPHILS # BLD AUTO: 0.02 K/UL
BASOPHILS NFR BLD AUTO: 0.4 %
BILIRUB SERPL-MCNC: 0.4 MG/DL (ref 0.1–1)
BUN SERPL-MCNC: 23 MG/DL (ref 8–23)
CALCIUM SERPL-MCNC: 9.2 MG/DL (ref 8.7–10.5)
CHLORIDE SERPL-SCNC: 108 MMOL/L (ref 95–110)
CO2 SERPL-SCNC: 24 MMOL/L (ref 23–29)
CREAT SERPL-MCNC: 1 MG/DL (ref 0.5–1.4)
ERYTHROCYTE [DISTWIDTH] IN BLOOD BY AUTOMATED COUNT: 11.6 % (ref 11.5–14.5)
GFR SERPLBLD CREATININE-BSD FMLA CKD-EPI: >60 ML/MIN/1.73/M2
GLUCOSE SERPL-MCNC: 87 MG/DL (ref 70–110)
HCT VFR BLD AUTO: 35.6 % (ref 40–54)
HGB BLD-MCNC: 11.6 GM/DL (ref 14–18)
IMM GRANULOCYTES # BLD AUTO: 0.01 K/UL (ref 0–0.04)
IMM GRANULOCYTES NFR BLD AUTO: 0.2 % (ref 0–0.5)
LYMPHOCYTES # BLD AUTO: 2.88 K/UL (ref 1–4.8)
MCH RBC QN AUTO: 31.8 PG (ref 27–31)
MCHC RBC AUTO-ENTMCNC: 32.6 G/DL (ref 32–36)
MCV RBC AUTO: 98 FL (ref 82–98)
NUCLEATED RBC (/100WBC) (OHS): 0 /100 WBC
PLATELET # BLD AUTO: 155 K/UL (ref 150–450)
PMV BLD AUTO: 12.2 FL (ref 9.2–12.9)
POTASSIUM SERPL-SCNC: 5 MMOL/L (ref 3.5–5.1)
PROT SERPL-MCNC: 6.9 GM/DL (ref 6–8.4)
PSA SERPL-MCNC: 0.02 NG/ML
RBC # BLD AUTO: 3.65 M/UL (ref 4.6–6.2)
RELATIVE EOSINOPHIL (OHS): 5.3 %
RELATIVE LYMPHOCYTE (OHS): 59.1 % (ref 18–48)
RELATIVE MONOCYTE (OHS): 10.1 % (ref 4–15)
RELATIVE NEUTROPHIL (OHS): 24.9 % (ref 38–73)
SODIUM SERPL-SCNC: 139 MMOL/L (ref 136–145)
WBC # BLD AUTO: 4.87 K/UL (ref 3.9–12.7)

## 2025-06-30 PROCEDURE — 84153 ASSAY OF PSA TOTAL: CPT

## 2025-06-30 PROCEDURE — 96402 CHEMO HORMON ANTINEOPL SQ/IM: CPT

## 2025-06-30 PROCEDURE — 3008F BODY MASS INDEX DOCD: CPT | Mod: CPTII,S$GLB,, | Performed by: HOSPITALIST

## 2025-06-30 PROCEDURE — 1159F MED LIST DOCD IN RCRD: CPT | Mod: CPTII,S$GLB,, | Performed by: HOSPITALIST

## 2025-06-30 PROCEDURE — 1126F AMNT PAIN NOTED NONE PRSNT: CPT | Mod: CPTII,S$GLB,, | Performed by: HOSPITALIST

## 2025-06-30 PROCEDURE — 3078F DIAST BP <80 MM HG: CPT | Mod: CPTII,S$GLB,, | Performed by: HOSPITALIST

## 2025-06-30 PROCEDURE — 85025 COMPLETE CBC W/AUTO DIFF WBC: CPT

## 2025-06-30 PROCEDURE — 82310 ASSAY OF CALCIUM: CPT

## 2025-06-30 PROCEDURE — 3074F SYST BP LT 130 MM HG: CPT | Mod: CPTII,S$GLB,, | Performed by: HOSPITALIST

## 2025-06-30 PROCEDURE — 36415 COLL VENOUS BLD VENIPUNCTURE: CPT

## 2025-06-30 PROCEDURE — 99999 PR PBB SHADOW E&M-EST. PATIENT-LVL III: CPT | Mod: PBBFAC,,, | Performed by: HOSPITALIST

## 2025-06-30 PROCEDURE — 3288F FALL RISK ASSESSMENT DOCD: CPT | Mod: CPTII,S$GLB,, | Performed by: HOSPITALIST

## 2025-06-30 PROCEDURE — 63600175 PHARM REV CODE 636 W HCPCS: Mod: JZ,TB | Performed by: HOSPITALIST

## 2025-06-30 PROCEDURE — 99214 OFFICE O/P EST MOD 30 MIN: CPT | Mod: S$GLB,,, | Performed by: HOSPITALIST

## 2025-06-30 PROCEDURE — G2211 COMPLEX E/M VISIT ADD ON: HCPCS | Mod: S$GLB,,, | Performed by: HOSPITALIST

## 2025-06-30 PROCEDURE — 1101F PT FALLS ASSESS-DOCD LE1/YR: CPT | Mod: CPTII,S$GLB,, | Performed by: HOSPITALIST

## 2025-06-30 RX ORDER — TAMSULOSIN HYDROCHLORIDE 0.4 MG/1
0.4 CAPSULE ORAL 2 TIMES DAILY
Qty: 180 CAPSULE | Refills: 5 | Status: SHIPPED | OUTPATIENT
Start: 2025-06-30

## 2025-06-30 RX ADMIN — LEUPROLIDE ACETATE 22.5 MG: KIT at 01:06

## 2025-06-30 NOTE — ASSESSMENT & PLAN NOTE
Tolerating ADT fairly well with minimal side effect. PSA under good control. No plan for repeat biopsy or salvage treatment per patient preference. Will add Enzalutamide per EMBARK and consider intermittent apprioach if PSA remains undectable next year.  - Con't ADT  - Lupron today; next 9/22/25  - Add enzalutamide 160mg daily  - FU 6 weeks symptom check

## 2025-06-30 NOTE — PROGRESS NOTES
[facility]: Established patient visit  Best Contact Phone Number(s): [phone] (home)       Cancer/Stage/TNM:    Cancer Staging   No matching staging information was found for the patient.        Reason for visit:  Biochemically recurrent prostate cancer    Molecular:  Germline Testin24 - Negative  Somatic Testing: N/A    Densitometry:  DEXA 24: Normal    Treatment History:        EBRT       ADT  25 -     ADT     HPI:   Mode Gabriel is a 69 y.o. male with metachronous recurrence of prostate cancer. Initially diagnosed  at [facility], underwent short course course ADT with definitive EBRT. Ultimately lost to follow up. Represented to PCP 10/2024 with PSA elevated to 5.98. Increased to 10.2 on 24. PSMA PET CT 2024 without metastatic disease. He started ADT 2025. He presents to medical oncology clinic for routine follow up    Interval Events:      Doing OK. No new concerns. Has some moderate fatigue with the hormonal therapy. No particular hot flashes. No signficant urinary urgency. Frequency is improved. No urinary hestiancy or weak flow. Nocturia about 2x per night.  Previously could not tolerate repeat prostate biopsy. He is not interested in pursuing further biopsy or salvage therapy. Will cotninue to treat with ADT and add enzalutamide for biochemical recurrence.       Lab Results   Component Value Date    PSADIAG 2.2 2025    PSADIAG 10.0 (H) 2024    PSADIAG 0.29 2021    PSA 0.02 2025    PSA 0.02 2025         History has been obtained by chart review and discussion with the patient.     Oncology History   Prostate cancer    Initial Diagnosis    : Initial diagnosis and treatment at [facility]. Reportedly Elvis 7. Received hormone therapy and short course ADT.      2019 Tumor Markers    2019 -- PSA 0.3  2020 -- PSA 0.3  3/5/2021 -- PSA 0.29     10/2024 Tumor Markers    10/07/2024: PSA  5.98  11/01/2024: PSA 10.2     12/20/2024 Imaging Significant Findings    12/20/24 PSMA PET   Impression:  - In this patient with remote history of prostate cancer, there is mild heterogeneous tracer uptake within the prostate.  Attention to follow with rising PSA. No tracer avid lesions elsewhere to suggest metastatic disease.  - Patchy ground-glass opacities throughout the lungs bilaterally, suggestive of infectious/inflammatory etiology.     6/30/2025 -  Chemotherapy    Treatment Summary   Plan Name: OP ENZALUTAMIDE DAILY  Treatment Goal: Palliative  Status: Active  Start Date: 6/30/2025  End Date: 6/30/2025  Provider: Eliseo Maravilla MD  Chemotherapy: enzalutamide 80 mg Tab, 160 mg (100 % of original dose 160 mg), Oral, Daily, 1 of 1 cycle, Start date: 6/30/2025, End date: --  Dose modification: 160 mg (original dose 160 mg, Cycle 1)           Past Medical History:   Diagnosis Date    Prostate cancer          Past Surgical History:   Procedure Laterality Date    BACK SURGERY N/A 2000    [facility]         Review of patient's allergies indicates:  No Known Allergies      Current Outpatient Medications   Medication Sig Dispense Refill    cholecalciferol, vitamin D3, (VITAMIN D3) 25 mcg (1,000 unit) capsule Take 1 capsule (1,000 Units total) by mouth once daily. 30 capsule 11    ciprofloxacin HCl (CIPRO) 500 MG tablet 1 pill one hour before prostate biopsy 1 tablet 0    diclofenac (VOLTAREN) 50 MG EC tablet Take 50 mg by mouth.      enzalutamide 80 mg Tab Take 160 mg by mouth once daily. 60 tablet 5    sildenafiL (VIAGRA) 100 MG tablet Take 1 tablet (100 mg total) by mouth daily as needed for Erectile Dysfunction. 10 tablet 12    tamsulosin (FLOMAX) 0.4 mg Cap Take 1 capsule (0.4 mg total) by mouth 2 (two) times a day. NEEDS OFFICE VISIT FOR FUTURE REFILLS 180 capsule 5     Current Facility-Administered Medications   Medication Dose Route Frequency Provider Last Rate Last Admin    LIDOcaine HCL 10 mg/ml (1%) injection  "20 mL  20 mL Other 1 time in Clinic/HOD Duane Barrios MD         Objective:      Physical Exam:   BP (!) 115/59 (BP Location: Left arm, Patient Position: Sitting)   Pulse 65   Temp 97.1 °F (36.2 °C) (Oral)   Resp 18   Ht 5' 7" (1.702 m)   Wt 81.5 kg (179 lb 10.8 oz)   SpO2 98%   BMI 28.14 kg/m²       ECOG Performance status: (0) Fully active, able to carry on all predisease performance without restriction     Physical Exam  Constitutional:       General: He is not in acute distress.     Appearance: Normal appearance.   HENT:      Head: Normocephalic.   Eyes:      General: No scleral icterus.     Extraocular Movements: Extraocular movements intact.      Conjunctiva/sclera: Conjunctivae normal.   Cardiovascular:      Rate and Rhythm: Normal rate.   Pulmonary:      Effort: Pulmonary effort is normal. No respiratory distress.   Abdominal:      General: There is no distension.      Palpations: Abdomen is soft.   Skin:     General: Skin is warm and dry.   Neurological:      Mental Status: He is alert and oriented to person, place, and time.      Motor: No weakness.   Psychiatric:         Mood and Affect: Mood normal.         Behavior: Behavior normal.         Thought Content: Thought content normal.          Recent Labs:   Lab Results   Component Value Date    WBC 4.87 06/30/2025    RBC 3.65 (L) 06/30/2025    HGB 11.6 (L) 06/30/2025    HCT 35.6 (L) 06/30/2025     06/30/2025     06/30/2025    K 5.0 06/30/2025     06/30/2025    CO2 24 06/30/2025    GLU 87 06/30/2025    BUN 23 06/30/2025    CREATININE 1.0 06/30/2025    CALCIUM 9.2 06/30/2025    BILITOT 0.4 06/30/2025    AST 30 06/30/2025    ALT 20 06/30/2025          Lab Results   Component Value Date    PSADIAG 2.2 01/06/2025    PSADIAG 10.0 (H) 12/11/2024    PSADIAG 0.29 03/05/2021    PSA 0.02 06/30/2025    PSA 0.02 03/31/2025        Cardiovascular Screening:  Primary care physician: Evens Martins MD      The ASCVD Risk score ([place] " "MARIELA, et al., 2019) failed to calculate for the following reasons:    Cannot find a previous HDL lab    Cannot find a previous total cholesterol lab    EKG: No results found for this or any previous visit.    Body mass index is 28.14 kg/m².    No results found for: "LABA1C", "CHOL", "LDLCALC", "HDL", "TRIG", "HGBA1C"       Bone Health    Lab Results   Component Value Date    JGRYHZZH37TL 24 (L) 12/11/2024        No results found for this or any previous visit.         PSMA PET IMAGING+     No results found for this or any previous visit.       I have personally reviewed the above imaging.     Path:   Reviewed pathology as documented above.      Diagnoses:     1. Prostate cancer    2. Urinary frequency    3. BPH with urinary obstruction          Assessment and Plan:     1. Prostate cancer  Overview:  Metachronous recurrence of prostate cancer. Initially diagnosed 2012 at [facility], underwent short course course ADT with definitive EBRT. Ultimately lost to follow up. Represented to PCP 10/2024 with pSA elevated to 5.98. PSMA PET CT 12/2024 without metastatic disease. He started ADT 01/2024.    Assessment & Plan:  Tolerating ADT fairly well with minimal side effect. PSA under good control. No plan for repeat biopsy or salvage treatment per patient preference. Will add Enzalutamide per EMBARK and consider intermittent apprioach if PSA remains undectable next year.  - Con't ADT  - Lupron today; next 9/22/25  - Add enzalutamide 160mg daily  - FU 6 weeks symptom check    Orders:  -     Physician communication order; Standing  -     Physician communication order; Standing  -     Physician communication order; Standing  -     Physician communication order; Standing  -     Physician communication order; Standing  -     enzalutamide 80 mg Tab; Take 160 mg by mouth once daily.  Dispense: 60 tablet; Refill: 5    2. Urinary frequency  -     tamsulosin (FLOMAX) 0.4 mg Cap; Take 1 capsule (0.4 mg total) by mouth 2 (two) times a day. " NEEDS OFFICE VISIT FOR FUTURE REFILLS  Dispense: 180 capsule; Refill: 5    3. BPH with urinary obstruction  -     tamsulosin (FLOMAX) 0.4 mg Cap; Take 1 capsule (0.4 mg total) by mouth 2 (two) times a day. NEEDS OFFICE VISIT FOR FUTURE REFILLS  Dispense: 180 capsule; Refill: 5    Other orders  -     leuprolide (LUPRON) injection 22.5 mg          Eliseo Maravilla MD MPH  Staff Physician     [facility] MD [address] [address]  Email: [email or web address]  Phone: (o) [phone] (c) [phone]               Follow up:   Route Chart for Scheduling    Med Onc Chart Routing      Follow up with physician 3 months. 12 weeks Renita with Lupron   Follow up with MICHAEL 6 weeks. symptom check on enzalutamide   Infusion scheduling note    Injection scheduling note Lupron 12 weeks   Labs CBC, CMP and PSA   Scheduling:  Preferred lab:  Lab interval:     Imaging    Pharmacy appointment    Other referrals                 Treatment Plan Information   OP ENZALUTAMIDE DAILY Eliseo Maravilla MD   Associated diagnosis: Prostate cancer   noted on 12/11/2024   Line of treatment: First Line  Treatment Goal: Palliative     Upcoming Treatment Dates - OP ENZALUTAMIDE DAILY    No upcoming days in selected categories.    Supportive Plan Information  OP PROSTATE LEUPROLIDE Q3MO Eliseo Maravilla MD   Associated Diagnosis: Prostate cancer   noted on 12/11/2024   Line of treatment: First Line   Treatment goal: Palliative     Upcoming Treatment Dates - OP PROSTATE LEUPROLIDE Q3MO    6/30/2025       Chemotherapy       leuprolide (LUPRON) injection 22.5 mg  9/22/2025       Chemotherapy       leuprolide (LUPRON) injection 22.5 mg  12/15/2025       Chemotherapy       leuprolide (LUPRON) injection 22.5 mg  3/9/2026       Chemotherapy       leuprolide (LUPRON) injection 22.5 mg         The above information has been reviewed with the patient and all questions have been answered to their apparent satisfaction.   They understand that they can call the clinic with any questions.       code applied: patient requires or will require a continuous, longitudinal, and active collaborative plan of care related to this patient's health condition, prostate cancer --the management of which requires the direction of a practitioner with specialized clinical knowledge, skill, and expertise.

## 2025-07-31 ENCOUNTER — PATIENT MESSAGE (OUTPATIENT)
Dept: HEMATOLOGY/ONCOLOGY | Facility: CLINIC | Age: 70
End: 2025-07-31
Payer: MEDICARE

## 2025-08-11 ENCOUNTER — OFFICE VISIT (OUTPATIENT)
Dept: HEMATOLOGY/ONCOLOGY | Facility: CLINIC | Age: 70
End: 2025-08-11
Payer: MEDICARE

## 2025-08-11 ENCOUNTER — LAB VISIT (OUTPATIENT)
Dept: LAB | Facility: HOSPITAL | Age: 70
End: 2025-08-11
Attending: HOSPITALIST
Payer: MEDICARE

## 2025-08-11 VITALS
SYSTOLIC BLOOD PRESSURE: 142 MMHG | WEIGHT: 178.25 LBS | DIASTOLIC BLOOD PRESSURE: 74 MMHG | BODY MASS INDEX: 27.98 KG/M2 | OXYGEN SATURATION: 97 % | HEIGHT: 67 IN | HEART RATE: 62 BPM

## 2025-08-11 DIAGNOSIS — I10 PRIMARY HYPERTENSION: ICD-10-CM

## 2025-08-11 DIAGNOSIS — Z79.818 LONG TERM (CURRENT) USE OF OTHER AGENTS AFFECTING ESTROGEN RECEPTORS AND ESTROGEN LEVELS: ICD-10-CM

## 2025-08-11 DIAGNOSIS — Z79.899 LONG TERM CURRENT USE OF THERAPEUTIC DRUG: ICD-10-CM

## 2025-08-11 DIAGNOSIS — C61 PROSTATE CANCER: ICD-10-CM

## 2025-08-11 DIAGNOSIS — C61 PROSTATE CANCER: Primary | ICD-10-CM

## 2025-08-11 DIAGNOSIS — N40.1 BPH WITH URINARY OBSTRUCTION: ICD-10-CM

## 2025-08-11 DIAGNOSIS — N13.8 BPH WITH URINARY OBSTRUCTION: ICD-10-CM

## 2025-08-11 LAB
ABSOLUTE NEUTROPHIL COUNT (OHS): 1.32 K/UL (ref 1.8–7.7)
ALBUMIN SERPL BCP-MCNC: 4 G/DL (ref 3.5–5.2)
ALP SERPL-CCNC: 75 UNIT/L (ref 40–150)
ALT SERPL W/O P-5'-P-CCNC: 20 UNIT/L (ref 0–55)
ANION GAP (OHS): 6 MMOL/L (ref 8–16)
AST SERPL-CCNC: 28 UNIT/L (ref 0–50)
BILIRUB SERPL-MCNC: 0.4 MG/DL (ref 0.1–1)
BUN SERPL-MCNC: 20 MG/DL (ref 8–23)
CALCIUM SERPL-MCNC: 9.7 MG/DL (ref 8.7–10.5)
CHLORIDE SERPL-SCNC: 108 MMOL/L (ref 95–110)
CO2 SERPL-SCNC: 27 MMOL/L (ref 23–29)
CREAT SERPL-MCNC: 1 MG/DL (ref 0.5–1.4)
ERYTHROCYTE [DISTWIDTH] IN BLOOD BY AUTOMATED COUNT: 11.9 % (ref 11.5–14.5)
GFR SERPLBLD CREATININE-BSD FMLA CKD-EPI: >60 ML/MIN/1.73/M2
GLUCOSE SERPL-MCNC: 83 MG/DL (ref 70–110)
HCT VFR BLD AUTO: 37.7 % (ref 40–54)
HGB BLD-MCNC: 12.3 GM/DL (ref 14–18)
IMM GRANULOCYTES # BLD AUTO: 0.01 K/UL (ref 0–0.04)
MCH RBC QN AUTO: 32 PG (ref 27–31)
MCHC RBC AUTO-ENTMCNC: 32.6 G/DL (ref 32–36)
MCV RBC AUTO: 98 FL (ref 82–98)
PLATELET # BLD AUTO: 166 K/UL (ref 150–450)
PMV BLD AUTO: 11.8 FL (ref 9.2–12.9)
POTASSIUM SERPL-SCNC: 5 MMOL/L (ref 3.5–5.1)
PROT SERPL-MCNC: 7.3 GM/DL (ref 6–8.4)
PSA SERPL-MCNC: <0.01 NG/ML
RBC # BLD AUTO: 3.84 M/UL (ref 4.6–6.2)
SODIUM SERPL-SCNC: 141 MMOL/L (ref 136–145)
WBC # BLD AUTO: 4.58 K/UL (ref 3.9–12.7)

## 2025-08-11 PROCEDURE — 84153 ASSAY OF PSA TOTAL: CPT

## 2025-08-11 PROCEDURE — 3078F DIAST BP <80 MM HG: CPT | Mod: CPTII,S$GLB,, | Performed by: NURSE PRACTITIONER

## 2025-08-11 PROCEDURE — 85027 COMPLETE CBC AUTOMATED: CPT

## 2025-08-11 PROCEDURE — 80053 COMPREHEN METABOLIC PANEL: CPT

## 2025-08-11 PROCEDURE — 3008F BODY MASS INDEX DOCD: CPT | Mod: CPTII,S$GLB,, | Performed by: NURSE PRACTITIONER

## 2025-08-11 PROCEDURE — 99215 OFFICE O/P EST HI 40 MIN: CPT | Mod: S$GLB,,, | Performed by: NURSE PRACTITIONER

## 2025-08-11 PROCEDURE — 36415 COLL VENOUS BLD VENIPUNCTURE: CPT

## 2025-08-11 PROCEDURE — 1101F PT FALLS ASSESS-DOCD LE1/YR: CPT | Mod: CPTII,S$GLB,, | Performed by: NURSE PRACTITIONER

## 2025-08-11 PROCEDURE — 1159F MED LIST DOCD IN RCRD: CPT | Mod: CPTII,S$GLB,, | Performed by: NURSE PRACTITIONER

## 2025-08-11 PROCEDURE — 99999 PR PBB SHADOW E&M-EST. PATIENT-LVL III: CPT | Mod: PBBFAC,,, | Performed by: NURSE PRACTITIONER

## 2025-08-11 PROCEDURE — 3077F SYST BP >= 140 MM HG: CPT | Mod: CPTII,S$GLB,, | Performed by: NURSE PRACTITIONER

## 2025-08-11 PROCEDURE — 1126F AMNT PAIN NOTED NONE PRSNT: CPT | Mod: CPTII,S$GLB,, | Performed by: NURSE PRACTITIONER

## 2025-08-11 PROCEDURE — 3288F FALL RISK ASSESSMENT DOCD: CPT | Mod: CPTII,S$GLB,, | Performed by: NURSE PRACTITIONER

## 2025-08-11 PROCEDURE — G2211 COMPLEX E/M VISIT ADD ON: HCPCS | Mod: S$GLB,,, | Performed by: NURSE PRACTITIONER
